# Patient Record
Sex: FEMALE | Race: WHITE | NOT HISPANIC OR LATINO | Employment: FULL TIME | URBAN - METROPOLITAN AREA
[De-identification: names, ages, dates, MRNs, and addresses within clinical notes are randomized per-mention and may not be internally consistent; named-entity substitution may affect disease eponyms.]

---

## 2017-02-24 ENCOUNTER — ALLSCRIPTS OFFICE VISIT (OUTPATIENT)
Dept: OTHER | Facility: OTHER | Age: 47
End: 2017-02-24

## 2017-02-24 LAB
BILIRUB UR QL STRIP: NORMAL
CLARITY UR: NORMAL
COLOR UR: YELLOW
GLUCOSE (HISTORICAL): NORMAL
HGB UR QL STRIP.AUTO: NORMAL
KETONES UR STRIP-MCNC: NORMAL MG/DL
LEUKOCYTE ESTERASE UR QL STRIP: NORMAL
NITRITE UR QL STRIP: NORMAL
PH UR STRIP.AUTO: 7 [PH]
PROT UR STRIP-MCNC: NORMAL MG/DL
SP GR UR STRIP.AUTO: 1.02
UROBILINOGEN UR QL STRIP.AUTO: 0.2

## 2017-04-07 ENCOUNTER — ALLSCRIPTS OFFICE VISIT (OUTPATIENT)
Dept: OTHER | Facility: OTHER | Age: 47
End: 2017-04-07

## 2017-04-07 DIAGNOSIS — H65.93 NONSUPPURATIVE OTITIS MEDIA OF BOTH EARS: ICD-10-CM

## 2017-05-24 ENCOUNTER — ALLSCRIPTS OFFICE VISIT (OUTPATIENT)
Dept: OTHER | Facility: OTHER | Age: 47
End: 2017-05-24

## 2017-05-27 ENCOUNTER — GENERIC CONVERSION - ENCOUNTER (OUTPATIENT)
Dept: OTHER | Facility: OTHER | Age: 47
End: 2017-05-27

## 2017-06-07 ENCOUNTER — GENERIC CONVERSION - ENCOUNTER (OUTPATIENT)
Dept: OTHER | Facility: OTHER | Age: 47
End: 2017-06-07

## 2017-06-09 ENCOUNTER — ALLSCRIPTS OFFICE VISIT (OUTPATIENT)
Dept: OTHER | Facility: OTHER | Age: 47
End: 2017-06-09

## 2017-08-16 ENCOUNTER — GENERIC CONVERSION - ENCOUNTER (OUTPATIENT)
Dept: OTHER | Facility: OTHER | Age: 47
End: 2017-08-16

## 2017-08-29 ENCOUNTER — ALLSCRIPTS OFFICE VISIT (OUTPATIENT)
Dept: OTHER | Facility: OTHER | Age: 47
End: 2017-08-29

## 2017-08-29 DIAGNOSIS — E03.9 HYPOTHYROIDISM: ICD-10-CM

## 2017-08-29 DIAGNOSIS — K59.00 CONSTIPATION: ICD-10-CM

## 2017-08-29 LAB
BILIRUB UR QL STRIP: NEGATIVE
CLARITY UR: NORMAL
COLOR UR: CLEAR
GLUCOSE (HISTORICAL): NEGATIVE
HGB UR QL STRIP.AUTO: NORMAL
KETONES UR STRIP-MCNC: NEGATIVE MG/DL
LEUKOCYTE ESTERASE UR QL STRIP: NEGATIVE
NITRITE UR QL STRIP: NEGATIVE
PH UR STRIP.AUTO: 5 [PH]
PROT UR STRIP-MCNC: 6 MG/DL
SP GR UR STRIP.AUTO: 1.01
UROBILINOGEN UR QL STRIP.AUTO: NEGATIVE

## 2017-09-14 ENCOUNTER — ALLSCRIPTS OFFICE VISIT (OUTPATIENT)
Dept: OTHER | Facility: OTHER | Age: 47
End: 2017-09-14

## 2017-10-02 ENCOUNTER — GENERIC CONVERSION - ENCOUNTER (OUTPATIENT)
Dept: OTHER | Facility: OTHER | Age: 47
End: 2017-10-02

## 2017-11-13 ENCOUNTER — GENERIC CONVERSION - ENCOUNTER (OUTPATIENT)
Dept: OTHER | Facility: OTHER | Age: 47
End: 2017-11-13

## 2017-12-06 ENCOUNTER — GENERIC CONVERSION - ENCOUNTER (OUTPATIENT)
Dept: OTHER | Facility: OTHER | Age: 47
End: 2017-12-06

## 2017-12-06 DIAGNOSIS — E66.9 OBESITY: ICD-10-CM

## 2017-12-06 DIAGNOSIS — R73.01 IMPAIRED FASTING GLUCOSE: ICD-10-CM

## 2017-12-06 DIAGNOSIS — K59.09 OTHER CONSTIPATION: ICD-10-CM

## 2017-12-18 ENCOUNTER — GENERIC CONVERSION - ENCOUNTER (OUTPATIENT)
Dept: OTHER | Facility: OTHER | Age: 47
End: 2017-12-18

## 2018-01-13 VITALS
RESPIRATION RATE: 14 BRPM | BODY MASS INDEX: 34.36 KG/M2 | TEMPERATURE: 99.3 F | OXYGEN SATURATION: 97 % | SYSTOLIC BLOOD PRESSURE: 120 MMHG | HEART RATE: 88 BPM | WEIGHT: 175 LBS | DIASTOLIC BLOOD PRESSURE: 80 MMHG | HEIGHT: 60 IN

## 2018-01-13 VITALS
WEIGHT: 166 LBS | DIASTOLIC BLOOD PRESSURE: 80 MMHG | HEART RATE: 91 BPM | RESPIRATION RATE: 12 BRPM | HEIGHT: 60 IN | SYSTOLIC BLOOD PRESSURE: 142 MMHG | BODY MASS INDEX: 32.59 KG/M2 | OXYGEN SATURATION: 98 %

## 2018-01-13 VITALS
RESPIRATION RATE: 14 BRPM | TEMPERATURE: 99.3 F | DIASTOLIC BLOOD PRESSURE: 80 MMHG | SYSTOLIC BLOOD PRESSURE: 110 MMHG | WEIGHT: 157.25 LBS | OXYGEN SATURATION: 98 % | HEART RATE: 70 BPM | BODY MASS INDEX: 29.69 KG/M2 | HEIGHT: 61 IN

## 2018-01-14 VITALS
SYSTOLIC BLOOD PRESSURE: 135 MMHG | WEIGHT: 164 LBS | DIASTOLIC BLOOD PRESSURE: 80 MMHG | OXYGEN SATURATION: 99 % | BODY MASS INDEX: 32.2 KG/M2 | HEART RATE: 106 BPM | HEIGHT: 60 IN | RESPIRATION RATE: 15 BRPM | TEMPERATURE: 98.2 F

## 2018-01-14 VITALS
WEIGHT: 169.8 LBS | HEART RATE: 83 BPM | HEIGHT: 61 IN | BODY MASS INDEX: 32.06 KG/M2 | OXYGEN SATURATION: 97 % | DIASTOLIC BLOOD PRESSURE: 86 MMHG | TEMPERATURE: 98.8 F | SYSTOLIC BLOOD PRESSURE: 124 MMHG

## 2018-01-15 VITALS
WEIGHT: 167.03 LBS | DIASTOLIC BLOOD PRESSURE: 82 MMHG | BODY MASS INDEX: 32.79 KG/M2 | HEIGHT: 60 IN | SYSTOLIC BLOOD PRESSURE: 124 MMHG | OXYGEN SATURATION: 97 % | HEART RATE: 82 BPM

## 2018-01-15 NOTE — MISCELLANEOUS
Message  Message Free Text Note Form: Phone call from [facility] radiologist Dr Miladis De Jesus with MRI result of L hip  He states it shows incomplete fracture of L femoral neck  Pt contacted and states she had seen Dr Lyssa Beckman from [facility] [place]  I contacted Dr Cheyenne Schmitt who is on call for that practice  He states pt should be on crutches or walker with light toe touching of left leg  She is to call his office on Tuesday and make follow up  Pt is to stay off work Tuesday for now  Pt is aware and understands instructions        Signatures   Electronically signed by : ELIEL Workman; May 27 2017  1:10PM EST                       (Author)

## 2018-01-17 NOTE — PROGRESS NOTES
Assessment    1  Hypothyroidism (244 9) (E03 9)   2  Persistent insomnia (307 42) (G47 00)   3  Situational anxiety (300 09) (F41 8)   4  Obesity (278 00) (E66 9)    Plan  Adjustment insomnia    · OLANZapine 5 MG Oral Tablet; TAKE 1 TABLET AT BEDTIME  Adjustment insomnia, Persistent insomnia    · Temazepam 30 MG Oral Capsule; TAKE 1 CAPSULE AT BEDTIME AS NEEDED  FOR SLEEP  Hypothyroidism    · From  [place] Thyroid 120 MG Oral Tablet TAKE 1 TABLET BY MOUTH EVERY  THURSDAY THROUGH SUNDAY DAILY To [place] Thyroid 120 MG Oral Tablet TAKE 1  TABLET BY MOUTH on Sat and Sunday   · From  [place] Thyroid 90 MG Oral Tablet TAKE 1 TABLET  BY MOUTH EVERY  MONDAY, TUESDAY AND WEDNESDAY DAILY To [place] Thyroid 90 MG Oral Tablet TAKE  1 TABLET  BY MOUTH EVERY MONDAY through Friday  Obesity    · From  Phentermine HCl - 15 MG Oral Capsule take one pill before lunch To  Phentermine HCl - 30 MG Oral Capsule TAKE 1 CAPSULE EVERY MORNING BEFORE  BREAKFAST  Situational anxiety    · OLANZapine 5 MG Oral Tablet Dispersible; may take one tablet during the day  as needed for anxiety prn qd   · Follow-up visit in 3 months Evaluation and Treatment  Follow-up  Status: Hold For -  Scheduling  Requested for: [id number]    Chief Complaint  patient is here to f/u on her medication   Patient is here today for follow up of chronic conditions described in HPI  History of Present Illness  Here for refill of medications  Feeling well  Uses the Zyprexa 5 mg at bedtime and has done so for several years with good results  Uses the Zyprexa dispersible 5 mg once during the day for anxiety as needed  She uses this maybe once or twice a week and has done so for years  She was able to come off Xanax and Ativan with the use of Zyprexa dispersible  Works at [employer] which is now [employer] which soon will be [facility] by the end of the month per [place]    Lots of stress bouts of anxiety but the Zyprexa helps her maintain her composure  Sees an endocrinologist for her hypothyroidism  Is maintained on [place] Thyroid  Last levels were fine      Active Problems    1  Acute otitis media (382 9) (H66 90)   2  Acute sinusitis (461 9) (J01 90)   3  Adjustment insomnia (307 41) (F51 02)   4  Bilateral otitis media with effusion (381 4) (H65 93)   5  Cough (786 2) (R05)   6  Depression with anxiety (300 4) (F41 8)   7  Ear pain, bilateral (388 70) (H92 03)   8  Hypothyroidism (244 9) (E03 9)   9  Jaw pain (784 92) (R68 84)   10  Obesity (278 00) (E66 9)   11  Persistent insomnia (307 42) (G47 00)   12  Situational anxiety (300 09) (F41 8)   13  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)   14  Vitamin B12 deficiency (266 2) (E53 8)    Past Medical History    1  Depression with anxiety (300 4) (F41 8)   2  Hypothyroidism (244 9) (E03 9)   3  Jaw pain (784 92) (R68 84)   4  Persistent insomnia (307 42) (G47 00)    The active problems and past medical history were reviewed and updated today  Surgical History    1  History of Myringotomy - With Ventilating Tube Insertion   2  History of Sinus Surgery    The surgical history was reviewed and updated today  Family History  Mother    1  Adopted (V68 89) (Z02 82)  Father    2  Adopted (M60 81) (Z02 82)    The family history was reviewed and updated today  Social History    · Former smoker (U69 35) (K47 734)   ·    · Rarely consumes alcohol (V49 89) (Z78 9)  The social history was reviewed and updated today  The social history was reviewed and is unchanged  Current Meds   1  [place] Thyroid 120 MG Oral Tablet; TAKE 1 TABLET BY MOUTH EVERY THURSDAY   THROUGH SUNDAY DAILY; Therapy: (Recorded:48Pcf7358) to Recorded   2  [place] Thyroid 90 MG Oral Tablet; TAKE 1 TABLET  BY MOUTH EVERY MONDAY,   TUESDAY AND WEDNESDAY DAILY; Therapy: (Recorded:18Tol8824) to Recorded   3  Fluticasone Propionate 50 MCG/ACT Nasal Suspension; USE 1 SPRAY IN EACH   NOSTRIL TWICE DAILY;    Therapy: 23WSB1590 to (Last Rx:05Oct2016)  Requested for: 95LEX7054 Ordered   4  OLANZapine 5 MG Oral Tablet Dispersible; may take one tablet during the day as   needed for anxiety prn qd; Therapy: 11YCH4251 to (Last Rx:02Nov2016)  Requested for: 98DVO9903 Ordered   5  Omeprazole 40 MG Oral Capsule Delayed Release; Therapy: 56HUM8289 to (Evaluate:16Oct2014) Recorded   6  Phentermine HCl - 15 MG Oral Capsule; take one pill before lunch; Therapy: 93CTT3292 to (Last Rx:02Nov2016) Ordered   7  Temazepam 30 MG Oral Capsule; TAKE 1 CAPSULE AT BEDTIME AS NEEDED FOR   SLEEP; Last Rx:02Nov2016 Ordered   8  ZyPREXA 2 5 MG Oral Tablet; TAKE 1 TABLET DAILY; Therapy: 60EOT2878 to [phone])  Requested for: 28OBG6133; Last   Rx:02Nov2016 Ordered    The medication list was reviewed and updated today  Allergies    1  tizanidine    Vitals  Vital Signs    Recorded: 11Pdb8573 04:25PM   Temperature 98 7 F   Heart Rate 77   Systolic 680   Diastolic 70   Height 5 ft 0 5 in   Weight 167 lb    BMI Calculated 32 08   BSA Calculated 1 74   O2 Saturation 97     Attending Note  Collaborating Note: I supervised the Advanced Practitioner and I agree with the Advanced Practitioner note        Future Appointments    Date/Time Provider Specialty Site   03/22/2017 04:00 PM Ally Brown, [facility], [facility]     Signatures   Electronically signed by : March Collet, [address] [place] [address]; Dec 21 2016  5:37PM EST                       (Author)    Electronically signed by : Traci Aranda DO; Dec 22 2016  8:01AM EST                       (Co-author)

## 2018-01-24 VITALS
OXYGEN SATURATION: 95 % | BODY MASS INDEX: 34.18 KG/M2 | WEIGHT: 174.13 LBS | HEIGHT: 60 IN | DIASTOLIC BLOOD PRESSURE: 82 MMHG | SYSTOLIC BLOOD PRESSURE: 125 MMHG | HEART RATE: 88 BPM | RESPIRATION RATE: 15 BRPM

## 2018-01-24 VITALS
TEMPERATURE: 98.8 F | OXYGEN SATURATION: 97 % | WEIGHT: 181 LBS | RESPIRATION RATE: 16 BRPM | HEIGHT: 60 IN | SYSTOLIC BLOOD PRESSURE: 140 MMHG | HEART RATE: 100 BPM | BODY MASS INDEX: 35.53 KG/M2 | DIASTOLIC BLOOD PRESSURE: 100 MMHG

## 2018-01-31 ENCOUNTER — TELEPHONE (OUTPATIENT)
Dept: FAMILY MEDICINE CLINIC | Facility: CLINIC | Age: 48
End: 2018-01-31

## 2018-01-31 NOTE — TELEPHONE ENCOUNTER
Pt called wanting to know if you revieced her labs and if she needs an appt or if she can get a call with the results    The paper copy is in your basket

## 2018-02-09 ENCOUNTER — TELEPHONE (OUTPATIENT)
Dept: FAMILY MEDICINE CLINIC | Facility: CLINIC | Age: 48
End: 2018-02-09

## 2018-02-09 NOTE — TELEPHONE ENCOUNTER
Call placed to Luz Maria Reyes about her lab work that was done on the 20th of January  Her TSH is elevated slightly at 5 67  Her endocrinologist is Dr Dionicio Vieyra  thyroid was changed to 120 mg Monday through Friday and 90 mg Saturday and Sunday    Her cholesterol and A1c are also slightly elevated   We will talk about that on Monday when she has a follow-up appointment with me

## 2018-02-12 ENCOUNTER — OFFICE VISIT (OUTPATIENT)
Dept: FAMILY MEDICINE CLINIC | Facility: CLINIC | Age: 48
End: 2018-02-12
Payer: COMMERCIAL

## 2018-02-12 VITALS
OXYGEN SATURATION: 98 % | HEIGHT: 60 IN | TEMPERATURE: 98.9 F | DIASTOLIC BLOOD PRESSURE: 90 MMHG | BODY MASS INDEX: 35.3 KG/M2 | HEART RATE: 82 BPM | RESPIRATION RATE: 14 BRPM | SYSTOLIC BLOOD PRESSURE: 140 MMHG | WEIGHT: 179.8 LBS

## 2018-02-12 DIAGNOSIS — H66.93 OTITIS OF BOTH EARS: ICD-10-CM

## 2018-02-12 DIAGNOSIS — F41.9 ANXIETY: Primary | ICD-10-CM

## 2018-02-12 DIAGNOSIS — F51.01 PRIMARY INSOMNIA: ICD-10-CM

## 2018-02-12 PROCEDURE — 99214 OFFICE O/P EST MOD 30 MIN: CPT | Performed by: FAMILY MEDICINE

## 2018-02-12 RX ORDER — TEMAZEPAM 30 MG/1
30 CAPSULE ORAL
Refills: 3 | COMMUNITY
Start: 2018-01-09 | End: 2018-02-12

## 2018-02-12 RX ORDER — LEVOTHYROXINE AND LIOTHYRONINE 57; 13.5 UG/1; UG/1
TABLET ORAL
COMMUNITY
End: 2019-07-31

## 2018-02-12 RX ORDER — OLANZAPINE 5 MG/1
TABLET, ORALLY DISINTEGRATING ORAL
COMMUNITY
Start: 2017-04-07 | End: 2018-02-12 | Stop reason: SDUPTHER

## 2018-02-12 RX ORDER — FLUTICASONE PROPIONATE 50 MCG
1 SPRAY, SUSPENSION (ML) NASAL 2 TIMES DAILY
COMMUNITY
Start: 2016-10-05 | End: 2018-11-30 | Stop reason: ALTCHOICE

## 2018-02-12 RX ORDER — MONTELUKAST SODIUM 10 MG/1
1 TABLET ORAL DAILY
COMMUNITY
Start: 2017-04-14 | End: 2018-11-30 | Stop reason: ALTCHOICE

## 2018-02-12 RX ORDER — HYDROCHLOROTHIAZIDE 12.5 MG/1
TABLET ORAL
Refills: 0 | COMMUNITY
Start: 2017-12-07 | End: 2019-07-31

## 2018-02-12 RX ORDER — OLANZAPINE 5 MG/1
5 TABLET, ORALLY DISINTEGRATING ORAL
Qty: 90 TABLET | Refills: 1 | OUTPATIENT
Start: 2018-02-12 | End: 2018-10-12 | Stop reason: SDUPTHER

## 2018-02-12 RX ORDER — ESZOPICLONE 3 MG/1
TABLET, FILM COATED ORAL
Qty: 30 TABLET | Refills: 3 | OUTPATIENT
Start: 2018-02-12 | End: 2018-02-16 | Stop reason: SDUPTHER

## 2018-02-12 RX ORDER — AMOXICILLIN 875 MG/1
TABLET, COATED ORAL
Qty: 20 TABLET | Refills: 0 | Status: SHIPPED | OUTPATIENT
Start: 2018-02-12 | End: 2018-02-22

## 2018-02-12 RX ORDER — LEVOTHYROXINE AND LIOTHYRONINE 76; 18 UG/1; UG/1
TABLET ORAL
COMMUNITY
End: 2020-02-07 | Stop reason: SDUPTHER

## 2018-02-12 RX ORDER — LINACLOTIDE 290 UG/1
1 CAPSULE, GELATIN COATED ORAL DAILY
Refills: 6 | COMMUNITY
Start: 2017-11-17 | End: 2018-04-18 | Stop reason: ALTCHOICE

## 2018-02-12 RX ORDER — LORAZEPAM 1 MG/1
TABLET ORAL
Qty: 30 TABLET | Refills: 3 | OUTPATIENT
Start: 2018-02-12 | End: 2018-08-06 | Stop reason: SDUPTHER

## 2018-02-12 RX ORDER — METHOCARBAMOL 750 MG/1
1 TABLET, FILM COATED ORAL 2 TIMES DAILY
COMMUNITY
Start: 2017-12-18 | End: 2018-02-12

## 2018-02-12 NOTE — PATIENT INSTRUCTIONS
Start the antibiotic take it twice a day for 10 days   Use [facility] brand of Mucinex plain Mucinex and continue with the thin the secretions behind her eardrums  Use your fluticasone nasal spray 1 spray both sides your nose every day   Use the 1500 calorie diet that the endocrinologist gave you and start the phentermine back up to help you stay on course   make sure you take phentermine earlier in the day so it does not interfere with her sleep

## 2018-02-12 NOTE — PROGRESS NOTES
Standard Visit   Assessment/Plan:     Visit Diagnosis:  Diagnoses and all orders for this visit:    Anxiety  -     OLANZapine (ZyPREXA ZYDIS) 5 mg dispersible tablet; Take 1 tablet (5 mg total) by mouth daily at bedtime for 90 days  -     LORazepam (ATIVAN) 1 mg tablet; May take 1 pill twice a day as needed for anxiety  P r n  Otitis of both ears  -     amoxicillin (AMOXIL) 875 mg tablet; Take 1 pill twice a day for 10 days    Primary insomnia  -     eszopiclone (LUNESTA) tablet; Take 1 pill half an hour before bedtime p r n  Other orders  -     thyroid (ARMOUR THYROID) 120 MG tablet; Take by mouth  -     fluticasone (FLONASE) 50 mcg/act nasal spray; 1 spray into each nostril 2 (two) times a day  -     hydrochlorothiazide (HYDRODIURIL) 12 5 mg tablet; TAKE 1 TABLET AS NEEDED FOR SWELLING OF HANDS AS NEEDED  -     LINZESS 290 MCG CAPS; Take 1 capsule by mouth daily  -     montelukast (SINGULAIR) 10 mg tablet; Take 1 tablet by mouth daily  -     thyroid (ARMOUR THYROID) 90 MG tablet; Take by mouth  -     Discontinue: OLANZapine (ZyPREXA ZYDIS) 5 mg dispersible tablet; Take by mouth  -     Discontinue: methocarbamol (ROBAXIN) 750 mg tablet; Take 1 tablet by mouth 2 (two) times a day  -     Discontinue: temazepam (RESTORIL) 30 mg capsule; Take 30 mg by mouth daily at bedtime as needed for sleep          Subjective:    Patient ID: Panfilo Smallwood is a 41 y o  female  Patient is here with the following concerns:    Her to review labs and meds   We treat her insomnia with temazepam and she has been on that for quite a while   we treat her anxiety with Zyprexa dissolving tab and occasional p r n   Ativan   she treats her chronic constipation with Linzess through Dr Laura is hypothyroid and she takes [place] Thyroid and she follows with Dr Jony Estrada    her last TSH was a bit under replaced so her [place] Thyroid was adjusted   120  Mg Monday through Friday and 90 mg for the weekend        The following portions of the patient's history were reviewed and updated as appropriate: allergies, current medications, past family history, past medical history, past social history, past surgical history and problem list     Review of Systems   Constitutional: Negative for activity change, fatigue, fever and unexpected weight change  HENT: Negative for congestion, ear pain, hearing loss, sinus pain, sore throat, tinnitus, trouble swallowing and voice change  Eyes: Negative for pain, discharge and visual disturbance  Respiratory: Negative for cough, chest tightness, shortness of breath and wheezing  Cardiovascular: Negative for chest pain, palpitations and leg swelling  Gastrointestinal: Negative for abdominal pain, blood in stool, diarrhea and vomiting  Endocrine: Negative for cold intolerance, heat intolerance and polyuria  Genitourinary: Negative for difficulty urinating, dysuria, flank pain, genital sores and urgency  Musculoskeletal: Negative for gait problem, joint swelling and neck stiffness  Skin: Negative for color change, rash and wound  Allergic/Immunologic: Negative for immunocompromised state  Neurological: Negative for syncope, speech difficulty and light-headedness  Psychiatric/Behavioral: Negative for behavioral problems, dysphoric mood and suicidal ideas  /90 (Patient Position: Sitting)   Pulse 82   Temp 98 9 °F (37 2 °C)   Resp 14   Ht 5' (1 524 m)   Wt 81 6 kg (179 lb 12 8 oz)   SpO2 98%   BMI 35 11 kg/m²   Social History     Social History    Marital status: /Civil Union     Spouse name: N/A    Number of children: N/A    Years of education: N/A     Occupational History    Not on file       Social History Main Topics    Smoking status: Never Smoker    Smokeless tobacco: Never Used    Alcohol use Not on file    Drug use: Unknown    Sexual activity: Not on file     Other Topics Concern    Not on file     Social History Narrative    No narrative on file     No past medical history on file  No family history on file  No past surgical history on file  Current Outpatient Prescriptions:     fluticasone (FLONASE) 50 mcg/act nasal spray, 1 spray into each nostril 2 (two) times a day, Disp: , Rfl:     montelukast (SINGULAIR) 10 mg tablet, Take 1 tablet by mouth daily, Disp: , Rfl:     OLANZapine (ZyPREXA ZYDIS) 5 mg dispersible tablet, Take 1 tablet (5 mg total) by mouth daily at bedtime for 90 days, Disp: 90 tablet, Rfl: 1    amoxicillin (AMOXIL) 875 mg tablet, Take 1 pill twice a day for 10 days, Disp: 20 tablet, Rfl: 0    eszopiclone (LUNESTA) tablet, Take 1 pill half an hour before bedtime p r n , Disp: 30 tablet, Rfl: 3    hydrochlorothiazide (HYDRODIURIL) 12 5 mg tablet, TAKE 1 TABLET AS NEEDED FOR SWELLING OF HANDS AS NEEDED, Disp: , Rfl: 0    LINZESS 290 MCG CAPS, Take 1 capsule by mouth daily, Disp: , Rfl: 6    LORazepam (ATIVAN) 1 mg tablet, May take 1 pill twice a day as needed for anxiety  P r n , Disp: 30 tablet, Rfl: 3    thyroid (ARMOUR THYROID) 120 MG tablet, Take by mouth, Disp: , Rfl:     thyroid (ARMOUR THYROID) 90 MG tablet, Take by mouth, Disp: , Rfl:       Objective:     Physical Exam   Constitutional: She is oriented to person, place, and time  She appears well-developed and well-nourished  HENT:   Head: Normocephalic and atraumatic  Eyes: Pupils are equal, round, and reactive to light  Neck: Normal range of motion  Neck supple  Cardiovascular: Normal rate and normal heart sounds  No murmur heard  Pulmonary/Chest: Effort normal and breath sounds normal  No respiratory distress  She exhibits no tenderness  Abdominal: Soft  Bowel sounds are normal    Musculoskeletal: Normal range of motion  She exhibits no tenderness  Lymphadenopathy:     She has no cervical adenopathy  Neurological: She is alert and oriented to person, place, and time  Coordination normal    Skin: Skin is warm and dry     Psychiatric: She has a normal mood and affect  Thought content normal    Nursing note and vitals reviewed          Patient Instructions    Start the antibiotic take it twice a day for 10 days   Use [facility] brand of Mucinex plain Mucinex and continue with the thin the secretions behind her eardrums  Use your fluticasone nasal spray 1 spray both sides your nose every day   Use the 1500 calorie diet that the endocrinologist gave you and start the phentermine back up to help you stay on course   make sure you take phentermine earlier in the day so it does not interfere with her sleep      Follow up here in prANNALEE Corado

## 2018-02-16 ENCOUNTER — TELEPHONE (OUTPATIENT)
Dept: FAMILY MEDICINE CLINIC | Facility: CLINIC | Age: 48
End: 2018-02-16

## 2018-02-16 DIAGNOSIS — F51.01 PRIMARY INSOMNIA: ICD-10-CM

## 2018-02-16 RX ORDER — ESZOPICLONE 3 MG/1
TABLET, FILM COATED ORAL
Qty: 30 TABLET | Refills: 3 | OUTPATIENT
Start: 2018-02-16 | End: 2018-08-06 | Stop reason: SDUPTHER

## 2018-02-16 NOTE — TELEPHONE ENCOUNTER
Ezequiel Cruz,    please call Vielka Mann and get the phone number for [facility]   it is the pharmacy in the basement of [facility]   the and then call in her Lunesta it's 3 mg 1 p o  Q h s  P r n   Insomnia dispense 30 with 3 refills thank you

## 2018-02-16 NOTE — TELEPHONE ENCOUNTER
Patient called, there is no dosage on her Rx for Lunesta  It should be sent to [address]  I can call the information in for you also    Thank you, Jacqueline Aguirre

## 2018-03-01 ENCOUNTER — TELEPHONE (OUTPATIENT)
Dept: FAMILY MEDICINE CLINIC | Facility: CLINIC | Age: 48
End: 2018-03-01

## 2018-03-01 DIAGNOSIS — R52 PAIN: Primary | ICD-10-CM

## 2018-03-01 RX ORDER — TRAMADOL HYDROCHLORIDE 50 MG/1
50 TABLET ORAL EVERY 8 HOURS PRN
Qty: 60 TABLET | Refills: 1 | Status: SHIPPED | OUTPATIENT
Start: 2018-03-01 | End: 2018-03-05 | Stop reason: SDUPTHER

## 2018-03-05 DIAGNOSIS — R52 PAIN: ICD-10-CM

## 2018-03-08 RX ORDER — TRAMADOL HYDROCHLORIDE 50 MG/1
50 TABLET ORAL EVERY 8 HOURS PRN
Qty: 60 TABLET | Refills: 0 | Status: SHIPPED | OUTPATIENT
Start: 2018-03-08 | End: 2018-03-15 | Stop reason: SDUPTHER

## 2018-03-15 ENCOUNTER — OFFICE VISIT (OUTPATIENT)
Dept: FAMILY MEDICINE CLINIC | Facility: CLINIC | Age: 48
End: 2018-03-15
Payer: COMMERCIAL

## 2018-03-15 VITALS
HEART RATE: 81 BPM | DIASTOLIC BLOOD PRESSURE: 82 MMHG | OXYGEN SATURATION: 96 % | BODY MASS INDEX: 33.85 KG/M2 | WEIGHT: 172.4 LBS | HEIGHT: 60 IN | SYSTOLIC BLOOD PRESSURE: 124 MMHG

## 2018-03-15 DIAGNOSIS — M62.838 MUSCLE SPASM: Primary | ICD-10-CM

## 2018-03-15 DIAGNOSIS — R52 PAIN: ICD-10-CM

## 2018-03-15 PROCEDURE — 99213 OFFICE O/P EST LOW 20 MIN: CPT | Performed by: FAMILY MEDICINE

## 2018-03-15 RX ORDER — TRAMADOL HYDROCHLORIDE 50 MG/1
50 TABLET ORAL EVERY 8 HOURS PRN
Qty: 90 TABLET | Refills: 1 | Status: SHIPPED | OUTPATIENT
Start: 2018-03-15 | End: 2018-04-14

## 2018-03-15 RX ORDER — BACLOFEN 20 MG/1
20 TABLET ORAL 3 TIMES DAILY PRN
Qty: 60 TABLET | Refills: 1 | Status: SHIPPED | OUTPATIENT
Start: 2018-03-15 | End: 2018-09-29 | Stop reason: SDUPTHER

## 2018-03-15 NOTE — PROGRESS NOTES
Standard Visit   Assessment/Plan:     Visit Diagnosis:  Diagnoses and all orders for this visit:    Muscle spasm  -     baclofen 20 mg tablet; Take 1 tablet (20 mg total) by mouth 3 (three) times a day as needed for muscle spasms for up to 30 days    Pain  -     traMADol (ULTRAM) 50 mg tablet; Take 1 tablet (50 mg total) by mouth every 8 (eight) hours as needed for moderate pain for up to 30 days  -     XR sacroiliac joints 3+ views; Future        get the x-ray of the SI joint   warm soaks to the area will also help   use the baclofen for the pain in the thigh   time will heal this    Subjective:    Patient ID: Gustavo Glass is a 41 y o  female  Patient is here with the following concerns:    Don Lidia down the stairs   actually bounced on the last stair   while carrying laundry down stairs   the brunt of the impact was on her left butt   she has no actual vertebral pain   the lingering pain is actually in her left thigh and it feels tight, spasm   tail bone does not hurt   she does have a past history of a left hip fracture   DEXA  The is up-to-date and does not show osteopenia or osteoporosis        The following portions of the patient's history were reviewed and updated as appropriate: allergies, current medications, past family history, past medical history, past social history, past surgical history and problem list     Review of Systems   Constitutional: Negative for activity change, fatigue, fever and unexpected weight change  HENT: Negative for congestion, ear pain, hearing loss, sinus pain, sore throat, tinnitus, trouble swallowing and voice change  Eyes: Negative for pain, discharge and visual disturbance  Respiratory: Negative for cough, chest tightness, shortness of breath and wheezing  Cardiovascular: Negative for chest pain, palpitations and leg swelling  Gastrointestinal: Negative for abdominal pain, blood in stool, diarrhea and vomiting     Endocrine: Negative for cold intolerance, heat intolerance and polyuria  Genitourinary: Negative for difficulty urinating, dysuria, flank pain, genital sores and urgency  Musculoskeletal: Positive for arthralgias and myalgias  Negative for gait problem, joint swelling and neck stiffness  Skin: Negative for color change, rash and wound  Allergic/Immunologic: Negative for immunocompromised state  Neurological: Negative for syncope, speech difficulty and light-headedness  Psychiatric/Behavioral: Negative for behavioral problems, dysphoric mood and suicidal ideas  /82   Pulse 81   Ht 5' (1 524 m)   Wt 78 2 kg (172 lb 6 4 oz)   SpO2 96%   BMI 33 67 kg/m²   Social History     Social History    Marital status: /Civil Union     Spouse name: N/A    Number of children: N/A    Years of education: N/A     Occupational History    Not on file       Social History Main Topics    Smoking status: Never Smoker    Smokeless tobacco: Never Used      Comment: former smoker noted in "allscripts"    Alcohol use Yes      Comment: Rarely     Drug use: Unknown    Sexual activity: Not on file     Other Topics Concern    Not on file     Social History Narrative    No narrative on file     Past Medical History:   Diagnosis Date    Depression with anxiety     last assessed: April 11, 2017    Hypothyroidism     last assessed: Aug 29, 2017    Persistent insomnia     last assessed: Dec 21, 2016     Family History   Problem Relation Age of Onset    Adopted: Yes     Past Surgical History:   Procedure Laterality Date    MYRINGOTOMY W/ TUBES Left     SINUS SURGERY         Current Outpatient Prescriptions:     eszopiclone (LUNESTA) tablet, Take 1 pill half an hour before bedtime p r n , Disp: 30 tablet, Rfl: 3    fluticasone (FLONASE) 50 mcg/act nasal spray, 1 spray into each nostril 2 (two) times a day, Disp: , Rfl:     hydrochlorothiazide (HYDRODIURIL) 12 5 mg tablet, TAKE 1 TABLET AS NEEDED FOR SWELLING OF HANDS AS NEEDED, Disp: , Rfl: 0    LINZESS 290 MCG CAPS, Take 1 capsule by mouth daily, Disp: , Rfl: 6    LORazepam (ATIVAN) 1 mg tablet, May take 1 pill twice a day as needed for anxiety  P r n , Disp: 30 tablet, Rfl: 3    montelukast (SINGULAIR) 10 mg tablet, Take 1 tablet by mouth daily, Disp: , Rfl:     OLANZapine (ZyPREXA ZYDIS) 5 mg dispersible tablet, Take 1 tablet (5 mg total) by mouth daily at bedtime for 90 days, Disp: 90 tablet, Rfl: 1    thyroid (ARMOUR THYROID) 120 MG tablet, Take by mouth, Disp: , Rfl:     thyroid (ARMOUR THYROID) 90 MG tablet, Take by mouth, Disp: , Rfl:     traMADol (ULTRAM) 50 mg tablet, Take 1 tablet (50 mg total) by mouth every 8 (eight) hours as needed for moderate pain for up to 30 days, Disp: 90 tablet, Rfl: 1    baclofen 20 mg tablet, Take 1 tablet (20 mg total) by mouth 3 (three) times a day as needed for muscle spasms for up to 30 days, Disp: 60 tablet, Rfl: 1      Objective:     Physical Exam   Constitutional: She is oriented to person, place, and time  She appears well-developed and well-nourished  HENT:   Head: Normocephalic and atraumatic  Eyes: Pupils are equal, round, and reactive to light  Neck: Normal range of motion  Neck supple  Cardiovascular: Normal rate and normal heart sounds  No murmur heard  Pulmonary/Chest: Effort normal and breath sounds normal  No respiratory distress  She exhibits no tenderness  Abdominal: Soft  Bowel sounds are normal    Musculoskeletal: Normal range of motion  She exhibits no tenderness  No discrete tailbone pain no discrete SI joint pain   no hematoma noted but she does have soft tissue tenderness in the area   Lymphadenopathy:     She has no cervical adenopathy  Neurological: She is alert and oriented to person, place, and time  Coordination normal    Skin: Skin is warm and dry  Psychiatric: She has a normal mood and affect  Thought content normal    Nursing note and vitals reviewed        Social History     Social History  Marital status: /Civil Union     Spouse name: N/A    Number of children: N/A    Years of education: N/A     Occupational History    Not on file       Social History Main Topics    Smoking status: Never Smoker    Smokeless tobacco: Never Used      Comment: former smoker noted in "allscripts"    Alcohol use Yes      Comment: Rarely     Drug use: Unknown    Sexual activity: Not on file     Other Topics Concern    Not on file     Social History Narrative    No narrative on file     Past Medical History:   Diagnosis Date    Depression with anxiety     last assessed: April 11, 2017    Hypothyroidism     last assessed: Aug 29, 2017    Persistent insomnia     last assessed: Dec 21, 2016       Current Outpatient Prescriptions:     eszopiclone (LUNESTA) tablet, Take 1 pill half an hour before bedtime p r n , Disp: 30 tablet, Rfl: 3    fluticasone (FLONASE) 50 mcg/act nasal spray, 1 spray into each nostril 2 (two) times a day, Disp: , Rfl:     hydrochlorothiazide (HYDRODIURIL) 12 5 mg tablet, TAKE 1 TABLET AS NEEDED FOR SWELLING OF HANDS AS NEEDED, Disp: , Rfl: 0    LINZESS 290 MCG CAPS, Take 1 capsule by mouth daily, Disp: , Rfl: 6    LORazepam (ATIVAN) 1 mg tablet, May take 1 pill twice a day as needed for anxiety  P r n , Disp: 30 tablet, Rfl: 3    montelukast (SINGULAIR) 10 mg tablet, Take 1 tablet by mouth daily, Disp: , Rfl:     OLANZapine (ZyPREXA ZYDIS) 5 mg dispersible tablet, Take 1 tablet (5 mg total) by mouth daily at bedtime for 90 days, Disp: 90 tablet, Rfl: 1    thyroid (ARMOUR THYROID) 120 MG tablet, Take by mouth, Disp: , Rfl:     thyroid (ARMOUR THYROID) 90 MG tablet, Take by mouth, Disp: , Rfl:     traMADol (ULTRAM) 50 mg tablet, Take 1 tablet (50 mg total) by mouth every 8 (eight) hours as needed for moderate pain for up to 30 days, Disp: 90 tablet, Rfl: 1    baclofen 20 mg tablet, Take 1 tablet (20 mg total) by mouth 3 (three) times a day as needed for muscle spasms for up to 30 days, Disp: 60 tablet, Rfl: 1  Family History   Problem Relation Age of Onset    Adopted: Yes       There are no Patient Instructions on file for this visit      get the x-ray of the SI joint   warm soaks to the area will also help   use the baclofen for the pain in the thigh   time will heal this      ANNALEE Dubois

## 2018-03-15 NOTE — PATIENT INSTRUCTIONS
get the x-ray of the SI joint   warm soaks to the area will also help   use the baclofen for the pain in the thigh   time will heal this

## 2018-03-26 ENCOUNTER — TELEPHONE (OUTPATIENT)
Dept: FAMILY MEDICINE CLINIC | Facility: CLINIC | Age: 48
End: 2018-03-26

## 2018-03-26 NOTE — TELEPHONE ENCOUNTER
Pt called asking for her Xray results done on 03/15  Results in chart, she also wants to let you know that she still in pain and maybe a lower spine Xray will be a good idea  She is aware we will call her back on Wednesday  Thank you

## 2018-03-27 NOTE — TELEPHONE ENCOUNTER
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

## 2018-04-18 ENCOUNTER — OFFICE VISIT (OUTPATIENT)
Dept: FAMILY MEDICINE CLINIC | Facility: CLINIC | Age: 48
End: 2018-04-18
Payer: COMMERCIAL

## 2018-04-18 VITALS
WEIGHT: 171 LBS | BODY MASS INDEX: 33.57 KG/M2 | HEART RATE: 58 BPM | DIASTOLIC BLOOD PRESSURE: 68 MMHG | OXYGEN SATURATION: 98 % | SYSTOLIC BLOOD PRESSURE: 122 MMHG | HEIGHT: 60 IN

## 2018-04-18 DIAGNOSIS — F06.4 ANXIETY DISORDER DUE TO MEDICAL CONDITION: ICD-10-CM

## 2018-04-18 DIAGNOSIS — Q05.7 SPINA BIFIDA OF LUMBOSACRAL REGION WITHOUT HYDROCEPHALUS (HCC): Primary | ICD-10-CM

## 2018-04-18 PROCEDURE — 99214 OFFICE O/P EST MOD 30 MIN: CPT | Performed by: FAMILY MEDICINE

## 2018-04-18 PROCEDURE — 3008F BODY MASS INDEX DOCD: CPT | Performed by: FAMILY MEDICINE

## 2018-04-18 RX ORDER — DIAZEPAM 10 MG/1
TABLET ORAL
Qty: 5 TABLET | Refills: 0 | Status: SHIPPED | OUTPATIENT
Start: 2018-04-18 | End: 2018-11-30 | Stop reason: ALTCHOICE

## 2018-04-18 RX ORDER — TEMAZEPAM 30 MG/1
30 CAPSULE ORAL
Refills: 3 | COMMUNITY
Start: 2018-03-13 | End: 2018-04-18 | Stop reason: ALTCHOICE

## 2018-04-18 NOTE — PATIENT INSTRUCTIONS
we are going to get an MRI of year lower back  We have found the you have spinal bifida occulta and you have severe back pain  Because of the MRI and you're fear of in closed spaces we are going to order Valium that she is going to take 0 5 hr before the procedure   You going to have to have a  so have your hubby drive you  Your going to be fine! After the MRI, then we will discuss physical therapy     we are going to get an MRI of year lower back  We have found the you have spinal bifida occulta and you have severe back pain  Because of the MRI and you're fear of in closed spaces we are going to order Valium that she is going to take 0 5 hr before the procedure   You going to have to have a  so have your hubby drive you  Your going to be fine! After the MRI, then we will discuss physical therapy     we are going to get an MRI of year lower back  We have found the you have spinal bifida occulta and you have severe back pain  Because of the MRI and you're fear of in closed spaces we are going to order Valium that she is going to take 0 5 hr before the procedure   You going to have to have a  so have your hubby drive you  Your going to be fine!   After the MRI, then we will discuss physical therapy

## 2018-04-18 NOTE — PROGRESS NOTES
Standard Visit   Assessment/Plan:     Visit Diagnosis:  Diagnoses and all orders for this visit:    Spina bifida of lumbosacral region without hydrocephalus ([facility] [address] )  -     MRI lumbar spine wo contrast; Future    Anxiety disorder due to medical condition  -     diazepam (VALIUM) 10 mg tablet; Take 1 pill 0 5 hr prior to MRI    Other orders  -     Discontinue: temazepam (RESTORIL) 30 mg capsule; Take 30 mg by mouth daily at bedtime as needed for sleep     we are going to get an MRI of year lower back  We have found the you have spinal bifida occulta and you have severe back pain  Because of the MRI and you're fear of in closed spaces we are going to order Valium that she is going to take 0 5 hr before the procedure   You going to have to have a  so have your hubby drive you  Your going to be fine! After the MRI, then we will discuss physical therapy         Subjective:    Patient ID: Juan Ghosh is a 41 y o  female  Patient is here with the following concerns:    Here for follow-up she is doing well  Her weight is coming down she is doing very well with her weight actually she signed up for [employer] [place] and she actually found out who her biological father is   she still does not know her biological mother's  History   the Delma Klinefelter is working quite well to get her to sleep however it is not keeping her to sleep she wakes up in the morning around 1 o'clock   She gets up and goes to sleep in the recliner and she does fall asleep quickly but we want the goal to be for her to stay asleep     She still working at [employer] at [employer]        The following portions of the patient's history were reviewed and updated as appropriate: allergies, current medications, past family history, past medical history, past social history, past surgical history and problem list     Review of Systems   Constitutional: Negative for activity change, fatigue, fever and unexpected weight change     HENT: Negative for congestion, ear pain, hearing loss, sinus pain, sore throat, tinnitus, trouble swallowing and voice change  Eyes: Negative for pain, discharge and visual disturbance  Respiratory: Negative for cough, chest tightness, shortness of breath and wheezing  Cardiovascular: Negative for chest pain, palpitations and leg swelling  Gastrointestinal: Negative for abdominal pain, blood in stool, diarrhea and vomiting  Endocrine: Negative for cold intolerance, heat intolerance and polyuria  Genitourinary: Negative for difficulty urinating, dysuria, flank pain, genital sores and urgency  Musculoskeletal: Negative for gait problem, joint swelling and neck stiffness  Skin: Negative for color change, rash and wound  Allergic/Immunologic: Negative for immunocompromised state  Neurological: Negative for syncope, speech difficulty and light-headedness  Psychiatric/Behavioral: Negative for behavioral problems, dysphoric mood and suicidal ideas  /68   Pulse 58   Ht 5' (1 524 m)   Wt 77 6 kg (171 lb)   SpO2 98%   BMI 33 40 kg/m²   Social History     Social History    Marital status: /Civil Union     Spouse name: N/A    Number of children: N/A    Years of education: N/A     Occupational History    Not on file       Social History Main Topics    Smoking status: Never Smoker    Smokeless tobacco: Never Used      Comment: former smoker noted in "allscripts"    Alcohol use Yes      Comment: Rarely     Drug use: Unknown    Sexual activity: Not on file     Other Topics Concern    Not on file     Social History Narrative    No narrative on file     Past Medical History:   Diagnosis Date    Depression with anxiety     last assessed: April 11, 2017    Hypothyroidism     last assessed: Aug 29, 2017    Persistent insomnia     last assessed: Dec 21, 2016     Family History   Problem Relation Age of Onset    Adopted: Yes     Past Surgical History:   Procedure Laterality Date    MYRINGOTOMY W/ TUBES Left  SINUS SURGERY         Current Outpatient Prescriptions:     fluticasone (FLONASE) 50 mcg/act nasal spray, 1 spray into each nostril 2 (two) times a day, Disp: , Rfl:     hydrochlorothiazide (HYDRODIURIL) 12 5 mg tablet, TAKE 1 TABLET AS NEEDED FOR SWELLING OF HANDS AS NEEDED, Disp: , Rfl: 0    LORazepam (ATIVAN) 1 mg tablet, May take 1 pill twice a day as needed for anxiety  P r n , Disp: 30 tablet, Rfl: 3    montelukast (SINGULAIR) 10 mg tablet, Take 1 tablet by mouth daily, Disp: , Rfl:     OLANZapine (ZyPREXA ZYDIS) 5 mg dispersible tablet, Take 1 tablet (5 mg total) by mouth daily at bedtime for 90 days, Disp: 90 tablet, Rfl: 1    thyroid (ARMOUR THYROID) 120 MG tablet, Take by mouth, Disp: , Rfl:     thyroid (ARMOUR THYROID) 90 MG tablet, Take by mouth, Disp: , Rfl:     baclofen 20 mg tablet, Take 1 tablet (20 mg total) by mouth 3 (three) times a day as needed for muscle spasms for up to 30 days, Disp: 60 tablet, Rfl: 1    diazepam (VALIUM) 10 mg tablet, Take 1 pill 0 5 hr prior to MRI, Disp: 5 tablet, Rfl: 0    eszopiclone (LUNESTA) tablet, Take 1 pill half an hour before bedtime p r n , Disp: 30 tablet, Rfl: 3      Objective:     Physical Exam   Constitutional: She is oriented to person, place, and time  She appears well-developed and well-nourished  HENT:   Head: Normocephalic and atraumatic  Eyes: Pupils are equal, round, and reactive to light  Neck: Normal range of motion  Neck supple  Cardiovascular: Normal rate and normal heart sounds  No murmur heard  Pulmonary/Chest: Effort normal and breath sounds normal  No respiratory distress  She exhibits no tenderness  Abdominal: Soft  Bowel sounds are normal    Musculoskeletal: Normal range of motion  She exhibits no tenderness  Lymphadenopathy:     She has no cervical adenopathy  Neurological: She is alert and oriented to person, place, and time  Coordination normal    Skin: Skin is warm and dry     Psychiatric: She has a normal mood and affect  Thought content normal    Nursing note and vitals reviewed  Social History     Social History    Marital status: /Civil Union     Spouse name: N/A    Number of children: N/A    Years of education: N/A     Occupational History    Not on file       Social History Main Topics    Smoking status: Never Smoker    Smokeless tobacco: Never Used      Comment: former smoker noted in "allscripts"    Alcohol use Yes      Comment: Rarely     Drug use: Unknown    Sexual activity: Not on file     Other Topics Concern    Not on file     Social History Narrative    No narrative on file     Past Medical History:   Diagnosis Date    Depression with anxiety     last assessed: April 11, 2017    Hypothyroidism     last assessed: Aug 29, 2017    Persistent insomnia     last assessed: Dec 21, 2016       Current Outpatient Prescriptions:     fluticasone (FLONASE) 50 mcg/act nasal spray, 1 spray into each nostril 2 (two) times a day, Disp: , Rfl:     hydrochlorothiazide (HYDRODIURIL) 12 5 mg tablet, TAKE 1 TABLET AS NEEDED FOR SWELLING OF HANDS AS NEEDED, Disp: , Rfl: 0    LORazepam (ATIVAN) 1 mg tablet, May take 1 pill twice a day as needed for anxiety  P r n , Disp: 30 tablet, Rfl: 3    montelukast (SINGULAIR) 10 mg tablet, Take 1 tablet by mouth daily, Disp: , Rfl:     OLANZapine (ZyPREXA ZYDIS) 5 mg dispersible tablet, Take 1 tablet (5 mg total) by mouth daily at bedtime for 90 days, Disp: 90 tablet, Rfl: 1    thyroid (ARMOUR THYROID) 120 MG tablet, Take by mouth, Disp: , Rfl:     thyroid (ARMOUR THYROID) 90 MG tablet, Take by mouth, Disp: , Rfl:     baclofen 20 mg tablet, Take 1 tablet (20 mg total) by mouth 3 (three) times a day as needed for muscle spasms for up to 30 days, Disp: 60 tablet, Rfl: 1    diazepam (VALIUM) 10 mg tablet, Take 1 pill 0 5 hr prior to MRI, Disp: 5 tablet, Rfl: 0    eszopiclone (LUNESTA) tablet, Take 1 pill half an hour before bedtime p r n , Disp: 30 tablet, Rfl: 3  Family History   Problem Relation Age of Onset    Adopted: Yes       There are no Patient Instructions on file for this visit  we are going to get an MRI of year lower back  We have found the you have spinal bifida occulta and you have severe back pain  Because of the MRI and you're fear of in closed spaces we are going to order Valium that she is going to take 0 5 hr before the procedure   You going to have to have a  so have your hubby drive you  Your going to be fine!   After the MRI, then we will discuss physical therapy           Patrice Meckel, CRNP

## 2018-07-09 ENCOUNTER — TELEPHONE (OUTPATIENT)
Dept: FAMILY MEDICINE CLINIC | Facility: CLINIC | Age: 48
End: 2018-07-09

## 2018-07-09 NOTE — TELEPHONE ENCOUNTER
Pt left a message on the medline asking for the MRI results  I don't see anything in the chart  Please call the pt to verify when and where she had it done

## 2018-07-11 NOTE — TELEPHONE ENCOUNTER
MRI LUMBAR SPINE WO CONTRAST (05/30/2018 3:29 PM)  MRI LUMBAR SPINE WO CONTRAST (05/30/2018 3:29 PM)   Specimen Performing Laboratory     RADIOLOGY       MRI LUMBAR SPINE WO CONTRAST (05/30/2018 3:29 PM)   Narrative           MRI lumbar spine unenhanced        History: Low back pain  Numbness  Pain and numbness radiating to    left lower extremity and also right lower extremity  No recent    trauma  Spina bifida of the lumbosacral region         Comparison: None         Technique: MRI of the lumbar spine was performed in the sagittal and    axial planes without intravenous contrast material         Report:        There is normal alignment and lordosis  There is a very subtle grade    1 retrolisthesis of L2 on L3  There is sacralization of L5 with a    rudimentary disc at L5-S1  Conus medullaris terminates at T12-L1         At T12-L1, there is minor bulging  There is no spinal stenosis or    foraminal stenosis          At L1-2, L2-3, and L3-4, there is no spinal stenosis or foraminal    stenosis          At L4-5, there is no spinal stenosis or appreciable foraminal    stenosis          At L5-S1, there is no spinal stenosis  There is no foraminal    stenosis          Paraspinal soft tissues are unremarkable         Impression:        Sacralization of L5 with a rudimentary disc at L5-S1  No spinal or    foraminal stenosis throughout the entire lumbar spine despite disc    bulging at L1-2 and L2-3                    MRI LUMBAR SPINE WO CONTRAST (05/30/2018 3:29 PM)   Procedure Note   Interface, Rad Results In - 05/30/2018 7:49 PM EDT        MRI lumbar spine unenhanced    History: Low back pain  Numbness  Pain and numbness radiating to  left lower extremity and also right lower extremity  No recent  trauma  Spina bifida of the lumbosacral region  Comparison: None  Technique: MRI of the lumbar spine was performed in the sagittal and  axial planes without intravenous contrast material     Report:     There is normal alignment and lordosis  There is a very subtle grade  1 retrolisthesis of L2 on L3  There is sacralization of L5 with a  rudimentary disc at L5-S1  Conus medullaris terminates at T12-L1  At T12-L1, there is minor bulging  There is no spinal stenosis or  foraminal stenosis  At L1-2, L2-3, and L3-4, there is no spinal stenosis or foraminal  stenosis  At L4-5, there is no spinal stenosis or appreciable foraminal  stenosis  At L5-S1, there is no spinal stenosis  There is no foraminal  stenosis  Paraspinal soft tissues are unremarkable  Impression:    Sacralization of L5 with a rudimentary disc at L5-S1   No spinal or  foraminal stenosis throughout the entire lumbar spine despite disc  bulging at L1-2 and L2-3

## 2018-08-02 DIAGNOSIS — F51.01 PRIMARY INSOMNIA: ICD-10-CM

## 2018-08-02 DIAGNOSIS — F41.9 ANXIETY: ICD-10-CM

## 2018-08-02 NOTE — TELEPHONE ENCOUNTER
Ativan - with RF    Lunesta - with RF    [facility]    She needs it before [facility]       Patient called this morning to see if meds were called in        Site checked ok

## 2018-08-06 RX ORDER — LORAZEPAM 1 MG/1
TABLET ORAL
Qty: 90 TABLET | Refills: 1 | OUTPATIENT
Start: 2018-08-06 | End: 2019-02-04 | Stop reason: SDUPTHER

## 2018-08-06 RX ORDER — ESZOPICLONE 3 MG/1
TABLET, FILM COATED ORAL
Qty: 90 TABLET | Refills: 1 | Status: SHIPPED | OUTPATIENT
Start: 2018-08-06 | End: 2018-11-30 | Stop reason: ALTCHOICE

## 2018-09-29 DIAGNOSIS — M62.838 MUSCLE SPASM: ICD-10-CM

## 2018-09-29 RX ORDER — BACLOFEN 20 MG/1
TABLET ORAL
Qty: 60 TABLET | Refills: 1 | Status: SHIPPED | OUTPATIENT
Start: 2018-09-29 | End: 2018-11-30 | Stop reason: ALTCHOICE

## 2018-10-12 DIAGNOSIS — F41.9 ANXIETY: ICD-10-CM

## 2018-10-12 RX ORDER — OLANZAPINE 5 MG/1
5 TABLET, ORALLY DISINTEGRATING ORAL
Qty: 90 TABLET | Refills: 0 | OUTPATIENT
Start: 2018-10-12 | End: 2019-03-06 | Stop reason: SDUPTHER

## 2018-11-08 ENCOUNTER — OFFICE VISIT (OUTPATIENT)
Dept: FAMILY MEDICINE CLINIC | Facility: CLINIC | Age: 48
End: 2018-11-08
Payer: COMMERCIAL

## 2018-11-08 VITALS
SYSTOLIC BLOOD PRESSURE: 120 MMHG | HEIGHT: 60 IN | WEIGHT: 173.4 LBS | DIASTOLIC BLOOD PRESSURE: 82 MMHG | BODY MASS INDEX: 34.04 KG/M2 | OXYGEN SATURATION: 98 % | HEART RATE: 60 BPM

## 2018-11-08 DIAGNOSIS — L08.9 INFECTED SKIN LESION: Primary | ICD-10-CM

## 2018-11-08 PROCEDURE — 99213 OFFICE O/P EST LOW 20 MIN: CPT | Performed by: FAMILY MEDICINE

## 2018-11-08 PROCEDURE — 3008F BODY MASS INDEX DOCD: CPT | Performed by: FAMILY MEDICINE

## 2018-11-08 RX ORDER — SULFAMETHOXAZOLE AND TRIMETHOPRIM 800; 160 MG/1; MG/1
1 TABLET ORAL EVERY 12 HOURS SCHEDULED
Qty: 20 TABLET | Refills: 0 | Status: SHIPPED | OUTPATIENT
Start: 2018-11-08 | End: 2018-11-18

## 2018-11-08 NOTE — PATIENT INSTRUCTIONS
It is not clear that this lesion is a boil  I tend to think it is an infected skin lesion  I would like you to take the Bactrim twice daily for the next 10 days  Warm soak the area with either a warm wet tab all as warm as tolerable every 4 hours for about 20 minutes for the next week  If it drains and feels better that is fine  If the lesion persists I would like you to make an appointment in 2 weeks for removal of lesion and a biopsy  I question an infected keloid  Keep the appt with Dr Abraham Carey for the ear recheck

## 2018-11-08 NOTE — PROGRESS NOTES
Assessment/Plan:     Chronic Problems:  No problem-specific Assessment & Plan notes found for this encounter  Visit Diagnosis:  Diagnoses and all orders for this visit:    Infected skin lesion  Comments: Will treat with bactrim bid and warm soaks  Not clear that this is a boil  This lesion may have to be removed  Orders:  -     sulfamethoxazole-trimethoprim (BACTRIM DS) 800-160 mg per tablet; Take 1 tablet by mouth every 12 (twelve) hours for 10 days          Subjective:    Patient ID: Jemal Johnson is a 57 y o  female  Pt has a boil under the left buttock for about 1 week  Has a white head on it  Has not tried anything for it yet  Did not drain yet  Remember hitting the area while shaving and then noticed it was there  Now she is very aware of the area  Takes all other meds as directed  No side effects noted  Pt had recent ear tubes put in by Dr Gustabo Goltz  Hearing is better but ear still feels clogged  The following portions of the patient's history were reviewed and updated as appropriate: allergies, current medications, past family history, past medical history, past social history, past surgical history and problem list     Review of Systems   Constitutional: Negative for chills, diaphoresis, fatigue and fever  HENT: Negative  Eyes: Negative  Respiratory: Negative for cough, shortness of breath and wheezing  Cardiovascular: Negative for chest pain and palpitations  Skin: Positive for wound  Neurological: Negative for dizziness, light-headedness and headaches  /82   Pulse 60   Ht 5' (1 524 m)   Wt 78 7 kg (173 lb 6 4 oz)   SpO2 98%   BMI 33 86 kg/m²   Social History     Social History    Marital status: /Civil Union     Spouse name: N/A    Number of children: N/A    Years of education: N/A     Occupational History    Not on file       Social History Main Topics    Smoking status: Never Smoker    Smokeless tobacco: Never Used      Comment: former smoker noted in "allscripts"    Alcohol use Yes      Comment: Rarely     Drug use: Unknown    Sexual activity: Not on file     Other Topics Concern    Not on file     Social History Narrative    No narrative on file     Past Medical History:   Diagnosis Date    Depression with anxiety     last assessed: April 11, 2017    Hypothyroidism     last assessed: Aug 29, 2017    Persistent insomnia     last assessed: Dec 21, 2016     Family History   Problem Relation Age of Onset    Adopted: Yes     Past Surgical History:   Procedure Laterality Date    MYRINGOTOMY W/ TUBES Left     SINUS SURGERY         Current Outpatient Prescriptions:     fluticasone (FLONASE) 50 mcg/act nasal spray, 1 spray into each nostril 2 (two) times a day, Disp: , Rfl:     hydrochlorothiazide (HYDRODIURIL) 12 5 mg tablet, TAKE 1 TABLET AS NEEDED FOR SWELLING OF HANDS AS NEEDED, Disp: , Rfl: 0    LORazepam (ATIVAN) 1 mg tablet, May take 1 pill twice a day as needed for anxiety  P r n , Disp: 90 tablet, Rfl: 1    OLANZapine (ZyPREXA ZYDIS) 5 mg dispersible tablet, Take 1 tablet (5 mg total) by mouth daily at bedtime for 90 days, Disp: 90 tablet, Rfl: 0    thyroid (ARMOUR THYROID) 120 MG tablet, Take by mouth, Disp: , Rfl:     thyroid (ARMOUR THYROID) 90 MG tablet, Take by mouth, Disp: , Rfl:     baclofen 20 mg tablet, TAKE 1 TABLET 3 TIMES A DAY AS NEEDED FOR MUSCLE SPASMS FOR UP TO 30 DAYS (Patient not taking: Reported on 11/8/2018), Disp: 60 tablet, Rfl: 1    diazepam (VALIUM) 10 mg tablet, Take 1 pill 0 5 hr prior to MRI (Patient not taking: Reported on 11/8/2018 ), Disp: 5 tablet, Rfl: 0    eszopiclone (LUNESTA) 3 MG tablet, Take 1 pill half an hour before bedtime p r n , Disp: 90 tablet, Rfl: 1    montelukast (SINGULAIR) 10 mg tablet, Take 1 tablet by mouth daily, Disp: , Rfl:     sulfamethoxazole-trimethoprim (BACTRIM DS) 800-160 mg per tablet, Take 1 tablet by mouth every 12 (twelve) hours for 10 days, Disp: 20 tablet, Rfl: 0    Allergies   Allergen Reactions    Levofloxacin      Annotation - [id number]: Joint pain    Other Other (See Comments)    Tizanidine           Lab Review   No visits with results within 2 Month(s) from this visit  Latest known visit with results is:   [facility] Office Visit on 08/29/2017   Component Date Value    Color, UA 08/29/2017 Clear     Clarity, UA 08/29/2017 Transparent     Leukocytes, UA 08/29/2017 negative     Nitrite, UA 08/29/2017 negative     Blood, UA 08/29/2017 5-10     Bilirubin, UA 08/29/2017 negative     Urobilinogen, UA 08/29/2017 negative     Protein, UA 08/29/2017 6 0     pH, UA 08/29/2017 5 0     Specific Gravity, UA 08/29/2017 1 010     Ketones, UA 08/29/2017 negative     Glucose 08/29/2017 negative         Imaging: No results found  Objective:     Physical Exam   Constitutional: She appears well-developed and well-nourished  HENT:   Head: Normocephalic and atraumatic  Mouth/Throat: Oropharynx is clear and moist    Ear tubes noted bilaterally  Right tm  with blood behind the tm with effusion noted  Eyes: Pupils are equal, round, and reactive to light  Conjunctivae and EOM are normal    Neck: Normal range of motion  Neck supple  Cardiovascular: Normal rate and regular rhythm  Pulmonary/Chest: Effort normal  No respiratory distress  She has no wheezes  Musculoskeletal: Normal range of motion  Skin:   Raised red skin lesion approx 10 mm noted under the left buttock  Feels fluctuant with small white areas on the lesion  Psychiatric: She has a normal mood and affect  Her behavior is normal  Judgment and thought content normal          Patient Instructions   It is not clear that this lesion is a boil  I tend to think it is an infected skin lesion  I would like you to take the Bactrim twice daily for the next 10 days  Warm soak the area with either a warm wet tab all as warm as tolerable every 4 hours for about 20 minutes for the next week  If it drains and feels better that is fine  If the lesion persists I would like you to make an appointment in 2 weeks for removal of lesion and a biopsy  I question an infected keloid  Keep the appt with Dr Kristine Duarte for the ear recheck         ANNALEE Reddy

## 2018-11-12 ENCOUNTER — TELEPHONE (OUTPATIENT)
Dept: FAMILY MEDICINE CLINIC | Facility: CLINIC | Age: 48
End: 2018-11-12

## 2018-11-26 NOTE — TELEPHONE ENCOUNTER
Patient called in regards to meds    She a script was sent in October for this med and one in August for the ativan with a refill     Patient was advised to check pharmacy for script    Patient has an appt with Gladis Dale tomorrow and will discuss chente

## 2018-11-26 NOTE — TELEPHONE ENCOUNTER
I can't make out what I am supposed to do with this  I saw the pt once for a boil  Looks like she has an appt tomorrow  What am I supposed to do with the ativan and the zyprexa?

## 2018-11-30 ENCOUNTER — OFFICE VISIT (OUTPATIENT)
Dept: FAMILY MEDICINE CLINIC | Facility: CLINIC | Age: 48
End: 2018-11-30
Payer: COMMERCIAL

## 2018-11-30 VITALS
OXYGEN SATURATION: 98 % | HEART RATE: 77 BPM | HEIGHT: 60 IN | WEIGHT: 172.6 LBS | BODY MASS INDEX: 33.89 KG/M2 | SYSTOLIC BLOOD PRESSURE: 128 MMHG | DIASTOLIC BLOOD PRESSURE: 76 MMHG

## 2018-11-30 DIAGNOSIS — S46.812A TRAPEZIUS STRAIN, LEFT, INITIAL ENCOUNTER: Primary | ICD-10-CM

## 2018-11-30 DIAGNOSIS — F41.9 ANXIETY: ICD-10-CM

## 2018-11-30 PROCEDURE — 1036F TOBACCO NON-USER: CPT | Performed by: FAMILY MEDICINE

## 2018-11-30 PROCEDURE — 99213 OFFICE O/P EST LOW 20 MIN: CPT | Performed by: FAMILY MEDICINE

## 2018-11-30 PROCEDURE — 3008F BODY MASS INDEX DOCD: CPT | Performed by: FAMILY MEDICINE

## 2018-11-30 RX ORDER — CYCLOBENZAPRINE HCL 10 MG
10 TABLET ORAL
Qty: 30 TABLET | Refills: 0 | Status: SHIPPED | OUTPATIENT
Start: 2018-11-30 | End: 2019-07-31

## 2018-11-30 NOTE — PATIENT INSTRUCTIONS
Discussed all with patient  Skin cyst has resolved it is looking like it is healing very well  Will add sertraline 1/2 pill daily for the next 3 days then 1 full pill daily as you have anxiety every day  If you have a problem on this medication call here in speak to me do not stop the medicine unless you have an allergic reaction which you are not going to have  Try to hold the lorazepam for now as I am putting you on cyclobenzaprine about a half an hour before bed which will make you very tired  I also suggest warm wet compresses to the neck as much as possible to loosen up those neck muscles  You may need to get a mouth guard

## 2018-11-30 NOTE — PROGRESS NOTES
Assessment/Plan:     Chronic Problems:  No problem-specific Assessment & Plan notes found for this encounter  Visit Diagnosis:  Diagnoses and all orders for this visit:    Trapezius strain, left, initial encounter  -     cyclobenzaprine (FLEXERIL) 10 mg tablet; Take 1 tablet (10 mg total) by mouth daily at bedtime as needed for muscle spasms    Anxiety  Comments: Will add sertraline 1/2 pill po qd x 3 days and then 1 day  Follow here in 3 weeks  Hold the lorazepam    Orders:  -     sertraline (ZOLOFT) 50 mg tablet; Take 1/2 pill daily for 3 days and then 1 daily          Subjective:    Patient ID: Gabby Lee is a 57 y o  female  Pt is here for f/u on her infected skin lesion on the posterior left thigh  Finished the bactrim and the area feels better  Thinks she has a sinus infection, but not sure if she is grinding her teeth  Home sick on Thursday and Wednesday with chills but is better today  Still has pains in her jaws bilaterally and pain in both sides of her anterior neck  Feels her anxiety is over the top  Using ativan but no relief of anxiety  Has been on olanzapine for anxiety by [facility]  Feels the anxiety is worse over the past 3 to 4 weeks  Dad passed away in June, other than that no new problems  Takes all other meds as directed  No side effects noted  The following portions of the patient's history were reviewed and updated as appropriate: allergies, current medications, past family history, past medical history, past social history, past surgical history and problem list     Review of Systems   Constitutional: Negative for chills, diaphoresis, fatigue and fever  HENT: Positive for congestion, ear pain (bilateral), rhinorrhea, sinus pain and sinus pressure  Eyes: Negative  Respiratory: Positive for cough (but intermittent dry cough)  Negative for shortness of breath and wheezing  Cardiovascular: Negative for chest pain and palpitations  Gastrointestinal: Negative  Endocrine:        Felt hot and cold and achy on Wednesday  Had her flu shot  Genitourinary: Negative  Musculoskeletal: Positive for myalgias (pain from the left neck to the the shoulder  Started on Tuesday  )  Neurological: Negative for dizziness, light-headedness and headaches  Psychiatric/Behavioral: Negative for dysphoric mood and suicidal ideas  The patient is nervous/anxious  Pt reports that she has anxiety every day  Does not even want to leave the house  No depression  /76   Pulse 77   Ht 5' (1 524 m)   Wt 78 3 kg (172 lb 9 6 oz)   SpO2 98%   BMI 33 71 kg/m²   Social History     Social History    Marital status: /Civil Union     Spouse name: N/A    Number of children: N/A    Years of education: N/A     Occupational History    Not on file       Social History Main Topics    Smoking status: Never Smoker    Smokeless tobacco: Never Used      Comment: former smoker noted in "allscripts"    Alcohol use Yes      Comment: Rarely     Drug use: Unknown    Sexual activity: Not on file     Other Topics Concern    Not on file     Social History Narrative    No narrative on file     Past Medical History:   Diagnosis Date    Depression with anxiety     last assessed: April 11, 2017    Hypothyroidism     last assessed: Aug 29, 2017    Persistent insomnia     last assessed: Dec 21, 2016     Family History   Problem Relation Age of Onset    Adopted: Yes     Past Surgical History:   Procedure Laterality Date    MYRINGOTOMY W/ TUBES Left     SINUS SURGERY         Current Outpatient Prescriptions:     cyclobenzaprine (FLEXERIL) 10 mg tablet, Take 1 tablet (10 mg total) by mouth daily at bedtime as needed for muscle spasms, Disp: 30 tablet, Rfl: 0    hydrochlorothiazide (HYDRODIURIL) 12 5 mg tablet, TAKE 1 TABLET AS NEEDED FOR SWELLING OF HANDS AS NEEDED, Disp: , Rfl: 0    LORazepam (ATIVAN) 1 mg tablet, May take 1 pill twice a day as needed for anxiety  P r n , Disp: 90 tablet, Rfl: 1    OLANZapine (ZyPREXA ZYDIS) 5 mg dispersible tablet, Take 1 tablet (5 mg total) by mouth daily at bedtime for 90 days, Disp: 90 tablet, Rfl: 0    sertraline (ZOLOFT) 50 mg tablet, Take 1/2 pill daily for 3 days and then 1 daily, Disp: 30 tablet, Rfl: 0    thyroid (ARMOUR THYROID) 120 MG tablet, Take by mouth, Disp: , Rfl:     thyroid (ARMOUR THYROID) 90 MG tablet, Take by mouth, Disp: , Rfl:     Allergies   Allergen Reactions    Levofloxacin      Annotation - [id number]: Joint pain    Other Other (See Comments)    Tizanidine           Lab Review   No visits with results within 6 Month(s) from this visit  Latest known visit with results is:   [facility] Office Visit on 08/29/2017   Component Date Value    Color, UA 08/29/2017 Clear     Clarity, UA 08/29/2017 Transparent     Leukocytes, UA 08/29/2017 negative     Nitrite, UA 08/29/2017 negative     Blood, UA 08/29/2017 5-10     Bilirubin, UA 08/29/2017 negative     Urobilinogen, UA 08/29/2017 negative     Protein, UA 08/29/2017 6 0     pH, UA 08/29/2017 5 0     Specific Gravity, UA 08/29/2017 1 010     Ketones, UA 08/29/2017 negative     Glucose 08/29/2017 negative         Imaging: No results found  Objective:     Physical Exam   Constitutional: She is oriented to person, place, and time  She appears well-developed and well-nourished  No distress  HENT:   Head: Normocephalic and atraumatic  Mouth/Throat: Oropharynx is clear and moist    Tubes noted in bilateral ears  Still with blood in the canal on the right, just seen by Dr Apurva Rueda and was told all was good  Slight jaw clicking noted bilaterally  Eyes: Pupils are equal, round, and reactive to light  Conjunctivae and EOM are normal  Right eye exhibits no discharge  Left eye exhibits no discharge  Neck: Normal range of motion  Neck supple  Very tender over the left trapezius   Full rom to neck    Cardiovascular: Normal rate, regular rhythm and normal heart sounds  Exam reveals no friction rub  No murmur heard  Pulmonary/Chest: Effort normal and breath sounds normal  No respiratory distress  She has no wheezes  She has no rales  Abdominal: Bowel sounds are normal    Musculoskeletal: Normal range of motion  She exhibits no edema, tenderness or deformity  Neurological: She is alert and oriented to person, place, and time  No cranial nerve deficit  Skin: Skin is warm and dry  No rash noted  She is not diaphoretic  Skin lesion left upper anterior thigh no longer infected  Appears to be a healing cyst area  Psychiatric: She has a normal mood and affect  Her behavior is normal  Judgment and thought content normal          Patient Instructions   Discussed all with patient  Skin cyst has resolved it is looking like it is healing very well  Will add sertraline 1/2 pill daily for the next 3 days then 1 full pill daily as you have anxiety every day  If you have a problem on this medication call here in speak to me do not stop the medicine unless you have an allergic reaction which you are not going to have  Try to hold the lorazepam for now as I am putting you on cyclobenzaprine about a half an hour before bed which will make you very tired  I also suggest warm wet compresses to the neck as much as possible to loosen up those neck muscles  You may need to get a mouth guard         ANNALEE Reddy

## 2018-12-11 ENCOUNTER — OFFICE VISIT (OUTPATIENT)
Dept: FAMILY MEDICINE CLINIC | Facility: CLINIC | Age: 48
End: 2018-12-11
Payer: COMMERCIAL

## 2018-12-11 VITALS
BODY MASS INDEX: 33.57 KG/M2 | OXYGEN SATURATION: 98 % | SYSTOLIC BLOOD PRESSURE: 150 MMHG | TEMPERATURE: 99.2 F | WEIGHT: 171 LBS | HEIGHT: 60 IN | RESPIRATION RATE: 18 BRPM | HEART RATE: 89 BPM | DIASTOLIC BLOOD PRESSURE: 92 MMHG

## 2018-12-11 DIAGNOSIS — F06.4 ANXIETY DISORDER DUE TO MEDICAL CONDITION: ICD-10-CM

## 2018-12-11 DIAGNOSIS — M62.838 MUSCLE SPASM: Primary | ICD-10-CM

## 2018-12-11 PROCEDURE — 3008F BODY MASS INDEX DOCD: CPT | Performed by: FAMILY MEDICINE

## 2018-12-11 PROCEDURE — 99213 OFFICE O/P EST LOW 20 MIN: CPT | Performed by: FAMILY MEDICINE

## 2018-12-11 PROCEDURE — 1036F TOBACCO NON-USER: CPT | Performed by: FAMILY MEDICINE

## 2018-12-11 NOTE — PROGRESS NOTES
Assessment/Plan:         Diagnoses and all orders for this visit:    Muscle spasm    Anxiety disorder due to medical condition        Muscle spasm, left trapezius  Discussed plan of care recommended local massage consider chiropractic, recommended Biofreeze icy Hot sports cream, reassured nonnodular  Anxiety  Encouraged to continue current plan of care maintain follow-up        Subjective:      Patient ID: Reyes Genao is a 57 y o  female  I think getting myself worked up  Could be medicines but I have a dry mouth, and feels swelling to the side of my neck, I do of thyroid problems, concerned about the nodules and lymph nodes, very stressed at work  Can stop massaging the side of my neck, feels muscular increases when bending my head to the opposite direction feels tender better with massage  Just started on new medicines for anxiety, also on medicines for muscle aches, but I am still working at the job which is still stressful  Taking thyroid medicines no changes  Negative fever chills sore throat weight loss weight gain, negative swallowing problems        The following portions of the patient's history were reviewed and updated as appropriate:   She has a past medical history of Depression with anxiety; Hypothyroidism; and Persistent insomnia ,   does not have any pertinent problems on file  ,   has a past surgical history that includes Myringotomy w/ tubes (Left) and Sinus surgery  ,  family history is not on file  She was adopted  ,   reports that she has never smoked  She has never used smokeless tobacco  She reports that she drinks alcohol  Her drug history is not on file  ,  is allergic to levofloxacin; other; and tizanidine         Review of Systems   Constitutional: Negative for appetite change, chills, fever and unexpected weight change     HENT: Negative for congestion, dental problem, ear pain, hearing loss, postnasal drip, rhinorrhea, sinus pain, sinus pressure, sneezing, sore throat, tinnitus and voice change  Eyes: Negative for visual disturbance  Respiratory: Negative for apnea, cough, chest tightness and shortness of breath  Cardiovascular: Negative for chest pain, palpitations and leg swelling  Gastrointestinal: Negative for abdominal pain, blood in stool, constipation, diarrhea, nausea and vomiting  Endocrine: Negative for cold intolerance, heat intolerance, polydipsia, polyphagia and polyuria  Genitourinary: Negative for decreased urine volume, difficulty urinating, dysuria, frequency and hematuria  Musculoskeletal: Positive for myalgias  Negative for arthralgias, back pain, gait problem and joint swelling  Skin: Negative for color change, rash and wound  Allergic/Immunologic: Negative for environmental allergies and food allergies  Neurological: Negative for dizziness, syncope, weakness, light-headedness, numbness and headaches  Hematological: Negative for adenopathy  Does not bruise/bleed easily  Psychiatric/Behavioral: Negative for sleep disturbance and suicidal ideas  The patient is nervous/anxious  Objective:  Vitals:    12/11/18 1558   BP: 150/92   Pulse: 89   Resp: 18   Temp: 99 2 °F (37 3 °C)   SpO2: 98%      Physical Exam   Constitutional: She is oriented to person, place, and time  She appears well-developed and well-nourished  No distress  HENT:   Head: Normocephalic and atraumatic  Right Ear: External ear normal    Left Ear: External ear normal    Eyes: Conjunctivae and EOM are normal  No scleral icterus  Neck: Normal range of motion  Neck supple  No thyromegaly present  Cardiovascular: Normal rate, regular rhythm and normal heart sounds  Pulmonary/Chest: Effort normal and breath sounds normal    Abdominal: Soft  Bowel sounds are normal    Musculoskeletal: Normal range of motion  Arms:  Left trapezius mild spasm tenderness, increasing with side bending or rotation   Lymphadenopathy:     She has no cervical adenopathy     Neurological: She is alert and oriented to person, place, and time  She has normal reflexes  Skin: Skin is warm and dry  Psychiatric: Her behavior is normal  Judgment and thought content normal  Her mood appears anxious

## 2018-12-21 DIAGNOSIS — Q05.7 SPINA BIFIDA OF LUMBOSACRAL REGION WITHOUT HYDROCEPHALUS (HCC): ICD-10-CM

## 2018-12-28 ENCOUNTER — OFFICE VISIT (OUTPATIENT)
Dept: FAMILY MEDICINE CLINIC | Facility: CLINIC | Age: 48
End: 2018-12-28
Payer: COMMERCIAL

## 2018-12-28 VITALS
OXYGEN SATURATION: 95 % | DIASTOLIC BLOOD PRESSURE: 82 MMHG | HEIGHT: 60 IN | SYSTOLIC BLOOD PRESSURE: 120 MMHG | WEIGHT: 170.6 LBS | HEART RATE: 90 BPM | BODY MASS INDEX: 33.49 KG/M2

## 2018-12-28 DIAGNOSIS — F41.9 ANXIETY: Primary | ICD-10-CM

## 2018-12-28 DIAGNOSIS — F41.9 ANXIETY: ICD-10-CM

## 2018-12-28 DIAGNOSIS — R59.1 LYMPHADENOPATHY OF HEAD AND NECK: ICD-10-CM

## 2018-12-28 PROCEDURE — 99213 OFFICE O/P EST LOW 20 MIN: CPT | Performed by: FAMILY MEDICINE

## 2018-12-28 PROCEDURE — 1036F TOBACCO NON-USER: CPT | Performed by: FAMILY MEDICINE

## 2018-12-28 NOTE — ASSESSMENT & PLAN NOTE
Stay on the sertraline and will follow in 4 weeks  Stop touching the neck I suspect this is now a habit

## 2018-12-28 NOTE — PATIENT INSTRUCTIONS
Discussed with patient  There seems to be some benefit from staying on the sertraline as you are less stressed at work and you are no longer grinding her teeth  I believe constantly touching that neck is just out of habit  The but I will get an ultrasound of the neck to rule out adenopathy and I will call with results  Stay on the sertraline follow-up here in 1 month as we may want to increase the dose

## 2018-12-28 NOTE — PROGRESS NOTES
Assessment/Plan:     Chronic Problems:  Anxiety  Stay on the sertraline and will follow in 4 weeks  Stop touching the neck I suspect this is now a habit  Visit Diagnosis:  Diagnoses and all orders for this visit:    Anxiety  -     sertraline (ZOLOFT) 50 mg tablet; Take 1 pill daily    Lymphadenopathy of head and neck  Comments:  Suspect muscle but will r/o adenopathy  Orders:  -     Cancel: US thyroid; Future  -     US head neck soft tissue; Future    Anxiety  Comments: Will add sertraline 1/2 pill po qd x 3 days and then 1 day  Follow here in 3 weeks  Hold the lorazepam    Orders:  -     sertraline (ZOLOFT) 50 mg tablet; Take 1 pill daily          Subjective:    Patient ID: Sagrario Redding is a 57 y o  female  Pt is here for f/u on her trapezius strain on the left and med changes  Took flexeril for a while with no relief, but is no longer grinding her teeth  Mouth feels dry  And feels she has swelling on the left side of the anterior neck  Feels better when she rubs her neck  Feels she is constantly touching that area  Does not feel any mass over there  Thinks it is a muscle, but can't keep her hand off it  Had an adjustment and it felt better, but still can't stop touching the area  Started the serrtaline and not sure if this has helped her anxiety yet  Feels less stressed at work and not grinding her teeth  Takes all other meds as directed  No side effects noted  The following portions of the patient's history were reviewed and updated as appropriate: allergies, current medications, past family history, past medical history, past social history, past surgical history and problem list     Review of Systems   Constitutional: Negative for chills, diaphoresis, fatigue and fever  HENT: Negative for congestion, ear pain, sneezing and sore throat  Has dry mouth  Respiratory: Negative for cough, shortness of breath and wheezing      Cardiovascular: Negative for chest pain and palpitations  Gastrointestinal: Negative  Genitourinary: Negative  Musculoskeletal:        Eva Colvin' say if there is pain on the right side of her neck as she keeps palpating the area  Neurological: Negative for dizziness, light-headedness and headaches  Psychiatric/Behavioral: Negative for hallucinations  The patient is nervous/anxious  /82   Pulse 90   Ht 5' (1 524 m)   Wt 77 4 kg (170 lb 9 6 oz)   SpO2 95%   BMI 33 32 kg/m²   Social History     Social History    Marital status: /Civil Union     Spouse name: N/A    Number of children: N/A    Years of education: N/A     Occupational History    Not on file       Social History Main Topics    Smoking status: Never Smoker    Smokeless tobacco: Never Used      Comment: former smoker noted in "allscripts"    Alcohol use Yes      Comment: Rarely     Drug use: Unknown    Sexual activity: Not on file     Other Topics Concern    Not on file     Social History Narrative    No narrative on file     Past Medical History:   Diagnosis Date    Depression with anxiety     last assessed: April 11, 2017    Hypothyroidism     last assessed: Aug 29, 2017    Persistent insomnia     last assessed: Dec 21, 2016     Family History   Problem Relation Age of Onset    Adopted: Yes     Past Surgical History:   Procedure Laterality Date    MYRINGOTOMY W/ TUBES Left     SINUS SURGERY         Current Outpatient Prescriptions:     cyclobenzaprine (FLEXERIL) 10 mg tablet, Take 1 tablet (10 mg total) by mouth daily at bedtime as needed for muscle spasms, Disp: 30 tablet, Rfl: 0    hydrochlorothiazide (HYDRODIURIL) 12 5 mg tablet, TAKE 1 TABLET AS NEEDED FOR SWELLING OF HANDS AS NEEDED, Disp: , Rfl: 0    LORazepam (ATIVAN) 1 mg tablet, May take 1 pill twice a day as needed for anxiety  P r n , Disp: 90 tablet, Rfl: 1    OLANZapine (ZyPREXA ZYDIS) 5 mg dispersible tablet, Take 1 tablet (5 mg total) by mouth daily at bedtime for 90 days, Disp: 90 tablet, Rfl: 0    sertraline (ZOLOFT) 50 mg tablet, Take 1 pill daily, Disp: 30 tablet, Rfl: 0    thyroid (ARMOUR THYROID) 120 MG tablet, Take by mouth, Disp: , Rfl:     thyroid (ARMOUR THYROID) 90 MG tablet, Take by mouth, Disp: , Rfl:     Allergies   Allergen Reactions    Levofloxacin      Annotation - [id number]: Joint pain    Other Other (See Comments)    Tizanidine           Lab Review   No visits with results within 2 Month(s) from this visit  Latest known visit with results is:   [facility] Office Visit on 08/29/2017   Component Date Value    Color, UA 08/29/2017 Clear     Clarity, UA 08/29/2017 Transparent     Leukocytes, UA 08/29/2017 negative     Nitrite, UA 08/29/2017 negative     Blood, UA 08/29/2017 5-10     Bilirubin, UA 08/29/2017 negative     Urobilinogen, UA 08/29/2017 negative     Protein, UA 08/29/2017 6 0     pH, UA 08/29/2017 5 0     Specific Gravity, UA 08/29/2017 1 010     Ketones, UA 08/29/2017 negative     Glucose 08/29/2017 negative         Imaging: No results found  Objective:     Physical Exam   Constitutional: She is oriented to person, place, and time  She appears well-developed and well-nourished  No distress  HENT:   Head: Normocephalic and atraumatic  Right Ear: External ear normal    Left Ear: External ear normal    Mouth/Throat: Oropharynx is clear and moist    Eyes: Pupils are equal, round, and reactive to light  Conjunctivae and EOM are normal  Right eye exhibits no discharge  Left eye exhibits no discharge  Neck: Normal range of motion  Neck supple  Cardiovascular: Normal rate, regular rhythm and normal heart sounds  Exam reveals no friction rub  No murmur heard  Pulmonary/Chest: Effort normal and breath sounds normal  No respiratory distress  She has no wheezes  She has no rales  Abdominal: Soft  Bowel sounds are normal  There is no tenderness  Musculoskeletal: Normal range of motion  She exhibits no edema, tenderness or deformity  Lymphadenopathy:     She has no cervical adenopathy (but patient is constantly touching the left anterior chain  )  Neurological: She is alert and oriented to person, place, and time  No cranial nerve deficit  Skin: Skin is warm and dry  No rash noted  She is not diaphoretic  Psychiatric: She has a normal mood and affect  Her behavior is normal  Judgment and thought content normal          Patient Instructions   Discussed with patient  There seems to be some benefit from staying on the sertraline as you are less stressed at work and you are no longer grinding her teeth  I believe constantly touching that neck is just out of habit  The but I will get an ultrasound of the neck to rule out adenopathy and I will call with results  Stay on the sertraline follow-up here in 1 month as we may want to increase the dose  ANNALEE Peña    Portions of the record may have been created with voice recognition software   Occasional wrong word or "sound a like" substitutions may have occurred due to the inherent limitations of voice recognition software   Read the chart carefully and recognize, using context, where substitutions have occurred

## 2019-01-11 ENCOUNTER — TELEPHONE (OUTPATIENT)
Dept: FAMILY MEDICINE CLINIC | Facility: CLINIC | Age: 49
End: 2019-01-11

## 2019-01-11 DIAGNOSIS — M54.2 NECK PAIN: Primary | ICD-10-CM

## 2019-01-11 NOTE — TELEPHONE ENCOUNTER
patient wanted to know what did you want her to do about the Zoloft   Did you want to try another med or wait until she comes in on the 24 th?

## 2019-01-11 NOTE — TELEPHONE ENCOUNTER
Dinesh Sandhu called she is requesting to speak with you  Wouldn't really explain the reason  She is currently at work so she asked if you can call her work phone

## 2019-01-11 NOTE — TELEPHONE ENCOUNTER
Spoke with patient she said the Zoloft was making her feel like she was crawling out of her skin  She just couldn't handle it so she stop taking it  She also is saying her neck is still bothering her  The pain is now in the back area of her neck and still on the side  She si asking if she can have an x-ray of the neck done   She knows that us was good

## 2019-01-14 ENCOUNTER — TELEPHONE (OUTPATIENT)
Dept: FAMILY MEDICINE CLINIC | Facility: CLINIC | Age: 49
End: 2019-01-14

## 2019-01-14 NOTE — TELEPHONE ENCOUNTER
Spoke with patient she is aware of her results  she said she still is with the pain  She thinks it is tension because she had a deep tissue massage which helped her a lot and then today the tension started back  She has been using warm compression and instead ice which helps       She has a af/u with you on 1/24/2019

## 2019-02-04 DIAGNOSIS — F41.9 ANXIETY: ICD-10-CM

## 2019-02-04 RX ORDER — LORAZEPAM 1 MG/1
TABLET ORAL
Qty: 30 TABLET | Refills: 1 | Status: SHIPPED | OUTPATIENT
Start: 2019-02-04 | End: 2019-03-06 | Stop reason: ALTCHOICE

## 2019-03-06 ENCOUNTER — OFFICE VISIT (OUTPATIENT)
Dept: FAMILY MEDICINE CLINIC | Facility: CLINIC | Age: 49
End: 2019-03-06
Payer: COMMERCIAL

## 2019-03-06 VITALS
SYSTOLIC BLOOD PRESSURE: 124 MMHG | HEART RATE: 88 BPM | OXYGEN SATURATION: 98 % | DIASTOLIC BLOOD PRESSURE: 86 MMHG | BODY MASS INDEX: 32 KG/M2 | WEIGHT: 163 LBS | HEIGHT: 60 IN

## 2019-03-06 DIAGNOSIS — E03.9 ACQUIRED HYPOTHYROIDISM: ICD-10-CM

## 2019-03-06 DIAGNOSIS — M62.838 MUSCLE SPASM: ICD-10-CM

## 2019-03-06 DIAGNOSIS — E78.2 MIXED HYPERLIPIDEMIA: ICD-10-CM

## 2019-03-06 DIAGNOSIS — G44.229 CHRONIC TENSION-TYPE HEADACHE, NOT INTRACTABLE: Primary | ICD-10-CM

## 2019-03-06 DIAGNOSIS — G47.00 INSOMNIA, UNSPECIFIED TYPE: ICD-10-CM

## 2019-03-06 DIAGNOSIS — F41.9 ANXIETY: ICD-10-CM

## 2019-03-06 DIAGNOSIS — R53.82 CHRONIC FATIGUE: ICD-10-CM

## 2019-03-06 DIAGNOSIS — Z12.11 SCREENING FOR COLON CANCER: ICD-10-CM

## 2019-03-06 DIAGNOSIS — I10 BENIGN HYPERTENSION: ICD-10-CM

## 2019-03-06 DIAGNOSIS — E55.9 HYPOVITAMINOSIS D: ICD-10-CM

## 2019-03-06 PROCEDURE — 99214 OFFICE O/P EST MOD 30 MIN: CPT | Performed by: FAMILY MEDICINE

## 2019-03-06 PROCEDURE — 1036F TOBACCO NON-USER: CPT | Performed by: FAMILY MEDICINE

## 2019-03-06 PROCEDURE — 3008F BODY MASS INDEX DOCD: CPT | Performed by: FAMILY MEDICINE

## 2019-03-06 PROCEDURE — 3079F DIAST BP 80-89 MM HG: CPT | Performed by: FAMILY MEDICINE

## 2019-03-06 PROCEDURE — 3074F SYST BP LT 130 MM HG: CPT | Performed by: FAMILY MEDICINE

## 2019-03-06 RX ORDER — OLANZAPINE 5 MG/1
5 TABLET, ORALLY DISINTEGRATING ORAL
Qty: 90 TABLET | Refills: 0 | Status: SHIPPED | OUTPATIENT
Start: 2019-03-06 | End: 2019-03-06 | Stop reason: SDUPTHER

## 2019-03-06 RX ORDER — OLANZAPINE 5 MG/1
5 TABLET, ORALLY DISINTEGRATING ORAL
Qty: 90 TABLET | Refills: 0 | Status: SHIPPED | OUTPATIENT
Start: 2019-03-06 | End: 2019-07-31 | Stop reason: SDUPTHER

## 2019-03-06 RX ORDER — ESZOPICLONE 3 MG/1
3 TABLET, FILM COATED ORAL
Qty: 30 TABLET | Refills: 3 | Status: SHIPPED | OUTPATIENT
Start: 2019-03-06 | End: 2019-07-17 | Stop reason: SDUPTHER

## 2019-03-06 RX ORDER — TOPIRAMATE 50 MG/1
TABLET, FILM COATED ORAL
Qty: 180 TABLET | Refills: 1 | Status: SHIPPED | OUTPATIENT
Start: 2019-03-06 | End: 2019-07-31

## 2019-03-06 NOTE — PROGRESS NOTES
Standard Visit   Assessment/Plan:     Visit Diagnosis:  Diagnoses and all orders for this visit:    Chronic tension-type headache, not intractable  -     topiramate (TOPAMAX) 50 MG tablet; Take 1 pill twice a day    Anxiety  -     Discontinue: OLANZapine (ZyPREXA ZYDIS) 5 mg dispersible tablet; Take 1 tablet (5 mg total) by mouth daily at bedtime for 90 days  -     OLANZapine (ZyPREXA ZYDIS) 5 mg dispersible tablet; Take 1 tablet (5 mg total) by mouth daily at bedtime for 90 days    Muscle spasm    Hypovitaminosis D  -     Vitamin D 25 hydroxy; Future    Acquired hypothyroidism  -     TSH, 3rd generation with Free T4 reflex; Future    Mixed hyperlipidemia  -     Lipid panel; Future  -     Comprehensive metabolic panel; Future    Chronic fatigue  -     CBC and differential; Future  -     Vitamin D 25 hydroxy; Future    Screening for colon cancer  -     Occult Blood, Fecal Immunochemical; Future    Benign hypertension  -     Microalbumin / creatinine urine ratio    Insomnia, unspecified type  -     eszopiclone (LUNESTA) 3 MG tablet; Take 1 tablet (3 mg total) by mouth daily at bedtime as needed for sleep Take immediately before bedtime    Start the topamax by taking 1/2 pill at night for one week  After that, take 1/2 pill morning and night for one week  Then,  you take a whole pill morning and night  You can continue to take her Lunesta prn just as you are doing  I am  filling your Zyprexa that you can use during the day for occasions of anxiety as you have been using that for several years with good results  You can also use the Zyprexa at night for teeth grinding as that works very well for you  Get the  fasting blood work done  Call me and let me know if you are due for your DEXA scan  Get the  mammo done        Subjective:    Patient ID: Claudia Brady is a 57 y o  female  Patient is here with the following concerns:  Needs meds refilled    Is out of her zyprexa that she uses on a prn basis for anxiety and teeth grinding  This med has worked very well for her for several years  Dentist has been very pleased with the status of her teeth  She still needs to have her mammo done  She has the script  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, but is looking to switch to [facility]  [facility] for hypothyroidism  Benign HTN treated to goal with HCTZ  She stopped the zoloft as she did not like the way it made her feel         HPI    The following portions of the patient's history were reviewed and updated as appropriate: allergies, current medications, past family history, past medical history, past social history, past surgical history and problem list     Review of Systems      /86   Pulse 88   Ht 5' (1 524 m)   Wt 73 9 kg (163 lb)   SpO2 98%   BMI 31 83 kg/m²   Social History     Socioeconomic History    Marital status: /Civil Union     Spouse name: Not on file    Number of children: Not on file    Years of education: Not on file    Highest education level: Not on file   Occupational History    Not on file   Social Needs    Financial resource strain: Not on file    Food insecurity:     Worry: Not on file     Inability: Not on file    Transportation needs:     Medical: Not on file     Non-medical: Not on file   Tobacco Use    Smoking status: Never Smoker    Smokeless tobacco: Never Used    Tobacco comment: former smoker noted in "allscripts"   Substance and Sexual Activity    Alcohol use: Yes     Comment: Rarely     Drug use: Not on file    Sexual activity: Not on file   Lifestyle    Physical activity:     Days per week: Not on file     Minutes per session: Not on file    Stress: Not on file   Relationships    Social connections:     Talks on phone: Not on file     Gets together: Not on file     Attends [employer] service: Not on file     Active member of club or organization: Not on file     Attends meetings of clubs or organizations: Not on file     Relationship status: Not on file    Intimate partner violence:     Fear of current or ex partner: Not on file     Emotionally abused: Not on file     Physically abused: Not on file     Forced sexual activity: Not on file   Other Topics Concern    Not on file   Social History Narrative    Not on file     Past Medical History:   Diagnosis Date    Depression with anxiety     last assessed: April 11, 2017    Hypothyroidism     last assessed: Aug 29, 2017    Persistent insomnia     last assessed: Dec 21, 2016     Family History   Adopted: Yes     Past Surgical History:   Procedure Laterality Date    MYRINGOTOMY W/ TUBES Left     SINUS SURGERY         Current Outpatient Medications:     thyroid (ARMOUR THYROID) 120 MG tablet, Take by mouth, Disp: , Rfl:     cyclobenzaprine (FLEXERIL) 10 mg tablet, Take 1 tablet (10 mg total) by mouth daily at bedtime as needed for muscle spasms (Patient not taking: Reported on 3/6/2019), Disp: 30 tablet, Rfl: 0    eszopiclone (LUNESTA) 3 MG tablet, Take 1 tablet (3 mg total) by mouth daily at bedtime as needed for sleep Take immediately before bedtime, Disp: 30 tablet, Rfl: 3    hydrochlorothiazide (HYDRODIURIL) 12 5 mg tablet, TAKE 1 TABLET AS NEEDED FOR SWELLING OF HANDS AS NEEDED, Disp: , Rfl: 0    OLANZapine (ZyPREXA ZYDIS) 5 mg dispersible tablet, Take 1 tablet (5 mg total) by mouth daily at bedtime for 90 days, Disp: 90 tablet, Rfl: 0    thyroid (ARMOUR THYROID) 90 MG tablet, Take by mouth, Disp: , Rfl:     topiramate (TOPAMAX) 50 MG tablet, Take 1 pill twice a day, Disp: 180 tablet, Rfl: 1      Objective:     Physical Exam   Constitutional: She is oriented to person, place, and time  She appears well-developed and well-nourished  HENT:   Head: Normocephalic and atraumatic  Eyes: Pupils are equal, round, and reactive to light  Neck: Normal range of motion  Neck supple  Cardiovascular: Normal rate     Pulmonary/Chest: Effort normal and breath sounds normal    Abdominal: Soft  Musculoskeletal: Normal range of motion  Neurological: She is alert and oriented to person, place, and time  Skin: Skin is warm and dry  Psychiatric: She has a normal mood and affect  Nursing note and vitals reviewed        Social History     Socioeconomic History    Marital status: /Civil Union     Spouse name: Not on file    Number of children: Not on file    Years of education: Not on file    Highest education level: Not on file   Occupational History    Not on file   Social Needs    Financial resource strain: Not on file    Food insecurity:     Worry: Not on file     Inability: Not on file    Transportation needs:     Medical: Not on file     Non-medical: Not on file   Tobacco Use    Smoking status: Never Smoker    Smokeless tobacco: Never Used    Tobacco comment: former smoker noted in "allscripts"   Substance and Sexual Activity    Alcohol use: Yes     Comment: Rarely     Drug use: Not on file    Sexual activity: Not on file   Lifestyle    Physical activity:     Days per week: Not on file     Minutes per session: Not on file    Stress: Not on file   Relationships    Social connections:     Talks on phone: Not on file     Gets together: Not on file     Attends [employer] service: Not on file     Active member of club or organization: Not on file     Attends meetings of clubs or organizations: Not on file     Relationship status: Not on file    Intimate partner violence:     Fear of current or ex partner: Not on file     Emotionally abused: Not on file     Physically abused: Not on file     Forced sexual activity: Not on file   Other Topics Concern    Not on file   Social History Narrative    Not on file     Past Medical History:   Diagnosis Date    Depression with anxiety     last assessed: April 11, 2017    Hypothyroidism     last assessed: Aug 29, 2017    Persistent insomnia     last assessed: Dec 21, 2016       Current Outpatient Medications:     thyroid (ARMOUR THYROID) 120 MG tablet, Take by mouth, Disp: , Rfl:     cyclobenzaprine (FLEXERIL) 10 mg tablet, Take 1 tablet (10 mg total) by mouth daily at bedtime as needed for muscle spasms (Patient not taking: Reported on 3/6/2019), Disp: 30 tablet, Rfl: 0    eszopiclone (LUNESTA) 3 MG tablet, Take 1 tablet (3 mg total) by mouth daily at bedtime as needed for sleep Take immediately before bedtime, Disp: 30 tablet, Rfl: 3    hydrochlorothiazide (HYDRODIURIL) 12 5 mg tablet, TAKE 1 TABLET AS NEEDED FOR SWELLING OF HANDS AS NEEDED, Disp: , Rfl: 0    OLANZapine (ZyPREXA ZYDIS) 5 mg dispersible tablet, Take 1 tablet (5 mg total) by mouth daily at bedtime for 90 days, Disp: 90 tablet, Rfl: 0    thyroid (ARMOUR THYROID) 90 MG tablet, Take by mouth, Disp: , Rfl:     topiramate (TOPAMAX) 50 MG tablet, Take 1 pill twice a day, Disp: 180 tablet, Rfl: 1  Family History   Adopted: Yes       Patient Instructions   TAKE HALF A PILL AT NIGHT FOR 1 WEEK  After that take half a pill morning and night for 1 week  After that you take a whole pill morning and night  You can continue to take her Serena Ranks just as you are doing  I am reorder filling your Zyprexa that you can use during the day for occasions of anxiety  You can also use the Zyprexa at night for teeth grinding that works very well  Get her fasting blood work done  Call me and let me know if you are due for your DEXA scan  Get her mammo done              ANNALEE Birmingham

## 2019-04-03 ENCOUNTER — TELEPHONE (OUTPATIENT)
Dept: FAMILY MEDICINE CLINIC | Facility: CLINIC | Age: 49
End: 2019-04-03

## 2019-04-04 DIAGNOSIS — M81.0 AGE-RELATED OSTEOPOROSIS WITHOUT CURRENT PATHOLOGICAL FRACTURE: Primary | ICD-10-CM

## 2019-04-19 ENCOUNTER — OFFICE VISIT (OUTPATIENT)
Dept: FAMILY MEDICINE CLINIC | Facility: CLINIC | Age: 49
End: 2019-04-19
Payer: COMMERCIAL

## 2019-04-19 VITALS
HEIGHT: 60 IN | HEART RATE: 90 BPM | DIASTOLIC BLOOD PRESSURE: 82 MMHG | TEMPERATURE: 99 F | BODY MASS INDEX: 32.39 KG/M2 | WEIGHT: 165 LBS | OXYGEN SATURATION: 98 % | SYSTOLIC BLOOD PRESSURE: 118 MMHG

## 2019-04-19 DIAGNOSIS — G89.29 CHRONIC BILATERAL LOW BACK PAIN WITH BILATERAL SCIATICA: Primary | ICD-10-CM

## 2019-04-19 DIAGNOSIS — M54.42 CHRONIC BILATERAL LOW BACK PAIN WITH BILATERAL SCIATICA: Primary | ICD-10-CM

## 2019-04-19 DIAGNOSIS — M54.41 CHRONIC BILATERAL LOW BACK PAIN WITH BILATERAL SCIATICA: Primary | ICD-10-CM

## 2019-04-19 PROCEDURE — 3008F BODY MASS INDEX DOCD: CPT | Performed by: NURSE PRACTITIONER

## 2019-04-19 PROCEDURE — 99213 OFFICE O/P EST LOW 20 MIN: CPT | Performed by: NURSE PRACTITIONER

## 2019-04-19 RX ORDER — PREDNISONE 20 MG/1
20 TABLET ORAL DAILY
Qty: 5 TABLET | Refills: 0 | Status: SHIPPED | OUTPATIENT
Start: 2019-04-19 | End: 2019-07-31

## 2019-04-19 RX ORDER — METHOCARBAMOL 500 MG/1
500 TABLET, FILM COATED ORAL 4 TIMES DAILY
Qty: 30 TABLET | Refills: 0 | Status: SHIPPED | OUTPATIENT
Start: 2019-04-19 | End: 2019-07-31

## 2019-05-08 ENCOUNTER — OFFICE VISIT (OUTPATIENT)
Dept: FAMILY MEDICINE CLINIC | Facility: CLINIC | Age: 49
End: 2019-05-08
Payer: COMMERCIAL

## 2019-05-08 VITALS
HEART RATE: 82 BPM | WEIGHT: 165.6 LBS | HEIGHT: 60 IN | SYSTOLIC BLOOD PRESSURE: 122 MMHG | DIASTOLIC BLOOD PRESSURE: 84 MMHG | BODY MASS INDEX: 32.51 KG/M2 | OXYGEN SATURATION: 97 %

## 2019-05-08 DIAGNOSIS — M54.42 CHRONIC LEFT-SIDED LOW BACK PAIN WITH LEFT-SIDED SCIATICA: Primary | ICD-10-CM

## 2019-05-08 DIAGNOSIS — G89.29 CHRONIC LEFT-SIDED LOW BACK PAIN WITH LEFT-SIDED SCIATICA: Primary | ICD-10-CM

## 2019-05-08 DIAGNOSIS — Q05.7 SPINA BIFIDA OF LUMBOSACRAL REGION WITHOUT HYDROCEPHALUS (HCC): ICD-10-CM

## 2019-05-08 PROCEDURE — 1036F TOBACCO NON-USER: CPT | Performed by: FAMILY MEDICINE

## 2019-05-08 PROCEDURE — 99213 OFFICE O/P EST LOW 20 MIN: CPT | Performed by: FAMILY MEDICINE

## 2019-05-08 RX ORDER — LORAZEPAM 1 MG/1
TABLET ORAL
COMMUNITY
Start: 2018-08-06 | End: 2019-07-31

## 2019-05-08 RX ORDER — TRAMADOL HYDROCHLORIDE 50 MG/1
50 TABLET ORAL EVERY 6 HOURS PRN
Qty: 45 TABLET | Refills: 0 | Status: SHIPPED | OUTPATIENT
Start: 2019-05-08 | End: 2019-07-31

## 2019-05-08 RX ORDER — TRAMADOL HYDROCHLORIDE 50 MG/1
50 TABLET ORAL EVERY 6 HOURS PRN
Qty: 45 TABLET | Refills: 0 | Status: SHIPPED | OUTPATIENT
Start: 2019-05-08 | End: 2019-05-08 | Stop reason: SDUPTHER

## 2019-05-08 RX ORDER — CELECOXIB 200 MG/1
200 CAPSULE ORAL DAILY
Refills: 0 | COMMUNITY
Start: 2019-04-23 | End: 2019-07-31

## 2019-05-09 ENCOUNTER — TELEPHONE (OUTPATIENT)
Dept: PHYSICAL THERAPY | Facility: OTHER | Age: 49
End: 2019-05-09

## 2019-05-10 ENCOUNTER — NURSE TRIAGE (OUTPATIENT)
Dept: PHYSICAL THERAPY | Facility: OTHER | Age: 49
End: 2019-05-10

## 2019-05-10 DIAGNOSIS — M54.42 ACUTE LEFT-SIDED LOW BACK PAIN WITH LEFT-SIDED SCIATICA: Primary | ICD-10-CM

## 2019-05-23 ENCOUNTER — EVALUATION (OUTPATIENT)
Dept: PHYSICAL THERAPY | Facility: CLINIC | Age: 49
End: 2019-05-23
Payer: COMMERCIAL

## 2019-05-23 VITALS — DIASTOLIC BLOOD PRESSURE: 99 MMHG | TEMPERATURE: 97.4 F | SYSTOLIC BLOOD PRESSURE: 134 MMHG

## 2019-05-23 DIAGNOSIS — M54.42 ACUTE LEFT-SIDED LOW BACK PAIN WITH LEFT-SIDED SCIATICA: Primary | ICD-10-CM

## 2019-05-23 PROCEDURE — G8990 OTHER PT/OT CURRENT STATUS: HCPCS | Performed by: PHYSICAL THERAPIST

## 2019-05-23 PROCEDURE — 97110 THERAPEUTIC EXERCISES: CPT | Performed by: PHYSICAL THERAPIST

## 2019-05-23 PROCEDURE — G8991 OTHER PT/OT GOAL STATUS: HCPCS | Performed by: PHYSICAL THERAPIST

## 2019-05-23 PROCEDURE — 97161 PT EVAL LOW COMPLEX 20 MIN: CPT | Performed by: PHYSICAL THERAPIST

## 2019-05-29 ENCOUNTER — TRANSCRIBE ORDERS (OUTPATIENT)
Dept: PHYSICAL THERAPY | Facility: CLINIC | Age: 49
End: 2019-05-29

## 2019-05-29 DIAGNOSIS — M54.42 ACUTE LEFT-SIDED LOW BACK PAIN WITH LEFT-SIDED SCIATICA: Primary | ICD-10-CM

## 2019-06-03 ENCOUNTER — OFFICE VISIT (OUTPATIENT)
Dept: PHYSICAL THERAPY | Facility: CLINIC | Age: 49
End: 2019-06-03
Payer: COMMERCIAL

## 2019-06-03 DIAGNOSIS — M54.42 ACUTE LEFT-SIDED LOW BACK PAIN WITH LEFT-SIDED SCIATICA: Primary | ICD-10-CM

## 2019-06-03 PROCEDURE — 97140 MANUAL THERAPY 1/> REGIONS: CPT | Performed by: PHYSICAL THERAPIST

## 2019-06-03 PROCEDURE — 97110 THERAPEUTIC EXERCISES: CPT | Performed by: PHYSICAL THERAPIST

## 2019-06-03 PROCEDURE — 97112 NEUROMUSCULAR REEDUCATION: CPT | Performed by: PHYSICAL THERAPIST

## 2019-06-04 ENCOUNTER — APPOINTMENT (OUTPATIENT)
Dept: PHYSICAL THERAPY | Facility: CLINIC | Age: 49
End: 2019-06-04
Payer: COMMERCIAL

## 2019-06-10 ENCOUNTER — APPOINTMENT (OUTPATIENT)
Dept: PHYSICAL THERAPY | Facility: CLINIC | Age: 49
End: 2019-06-10
Payer: COMMERCIAL

## 2019-06-11 ENCOUNTER — APPOINTMENT (OUTPATIENT)
Dept: PHYSICAL THERAPY | Facility: CLINIC | Age: 49
End: 2019-06-11
Payer: COMMERCIAL

## 2019-06-17 ENCOUNTER — OFFICE VISIT (OUTPATIENT)
Dept: PHYSICAL THERAPY | Facility: CLINIC | Age: 49
End: 2019-06-17
Payer: COMMERCIAL

## 2019-06-17 DIAGNOSIS — M54.42 ACUTE LEFT-SIDED LOW BACK PAIN WITH LEFT-SIDED SCIATICA: Primary | ICD-10-CM

## 2019-06-17 PROCEDURE — 97140 MANUAL THERAPY 1/> REGIONS: CPT | Performed by: PHYSICAL THERAPIST

## 2019-06-17 PROCEDURE — 97112 NEUROMUSCULAR REEDUCATION: CPT | Performed by: PHYSICAL THERAPIST

## 2019-06-20 ENCOUNTER — OFFICE VISIT (OUTPATIENT)
Dept: PHYSICAL THERAPY | Facility: CLINIC | Age: 49
End: 2019-06-20
Payer: COMMERCIAL

## 2019-06-20 DIAGNOSIS — M54.42 ACUTE LEFT-SIDED LOW BACK PAIN WITH LEFT-SIDED SCIATICA: Primary | ICD-10-CM

## 2019-06-20 PROCEDURE — 97112 NEUROMUSCULAR REEDUCATION: CPT | Performed by: PHYSICAL THERAPIST

## 2019-06-20 PROCEDURE — 97140 MANUAL THERAPY 1/> REGIONS: CPT | Performed by: PHYSICAL THERAPIST

## 2019-07-17 DIAGNOSIS — G47.00 INSOMNIA, UNSPECIFIED TYPE: ICD-10-CM

## 2019-07-20 RX ORDER — ESZOPICLONE 3 MG/1
3 TABLET, FILM COATED ORAL
Qty: 30 TABLET | Refills: 3 | Status: SHIPPED | OUTPATIENT
Start: 2019-07-20 | End: 2019-11-06 | Stop reason: SDUPTHER

## 2019-07-31 ENCOUNTER — OFFICE VISIT (OUTPATIENT)
Dept: FAMILY MEDICINE CLINIC | Facility: CLINIC | Age: 49
End: 2019-07-31
Payer: COMMERCIAL

## 2019-07-31 VITALS
BODY MASS INDEX: 33.38 KG/M2 | OXYGEN SATURATION: 98 % | HEIGHT: 60 IN | HEART RATE: 72 BPM | DIASTOLIC BLOOD PRESSURE: 78 MMHG | WEIGHT: 170 LBS | SYSTOLIC BLOOD PRESSURE: 116 MMHG

## 2019-07-31 DIAGNOSIS — F41.9 ANXIETY: ICD-10-CM

## 2019-07-31 DIAGNOSIS — Q05.7 SPINA BIFIDA OF LUMBOSACRAL REGION WITHOUT HYDROCEPHALUS (HCC): ICD-10-CM

## 2019-07-31 DIAGNOSIS — E55.9 HYPOVITAMINOSIS D: ICD-10-CM

## 2019-07-31 DIAGNOSIS — M54.41 CHRONIC BILATERAL LOW BACK PAIN WITH BILATERAL SCIATICA: ICD-10-CM

## 2019-07-31 DIAGNOSIS — K21.9 GASTROESOPHAGEAL REFLUX DISEASE WITHOUT ESOPHAGITIS: Primary | ICD-10-CM

## 2019-07-31 DIAGNOSIS — G89.29 CHRONIC BILATERAL LOW BACK PAIN WITH BILATERAL SCIATICA: ICD-10-CM

## 2019-07-31 DIAGNOSIS — M54.42 CHRONIC BILATERAL LOW BACK PAIN WITH BILATERAL SCIATICA: ICD-10-CM

## 2019-07-31 PROCEDURE — 99214 OFFICE O/P EST MOD 30 MIN: CPT | Performed by: FAMILY MEDICINE

## 2019-07-31 PROCEDURE — 1036F TOBACCO NON-USER: CPT | Performed by: FAMILY MEDICINE

## 2019-07-31 PROCEDURE — 3008F BODY MASS INDEX DOCD: CPT | Performed by: FAMILY MEDICINE

## 2019-07-31 RX ORDER — ERGOCALCIFEROL 1.25 MG/1
CAPSULE ORAL
Qty: 8 CAPSULE | Refills: 0 | Status: SHIPPED | OUTPATIENT
Start: 2019-07-31

## 2019-07-31 RX ORDER — OLANZAPINE 5 MG/1
5 TABLET, ORALLY DISINTEGRATING ORAL
Qty: 90 TABLET | Refills: 1 | Status: SHIPPED | OUTPATIENT
Start: 2019-07-31 | End: 2019-07-31 | Stop reason: SDUPTHER

## 2019-07-31 RX ORDER — LORAZEPAM 1 MG/1
TABLET ORAL
Qty: 60 TABLET | Refills: 1 | Status: SHIPPED | OUTPATIENT
Start: 2019-07-31 | End: 2019-11-06 | Stop reason: SDUPTHER

## 2019-07-31 RX ORDER — OLANZAPINE 5 MG/1
TABLET, ORALLY DISINTEGRATING ORAL
Qty: 90 TABLET | Refills: 1 | Status: SHIPPED | OUTPATIENT
Start: 2019-07-31 | End: 2019-07-31 | Stop reason: SDUPTHER

## 2019-07-31 RX ORDER — PANTOPRAZOLE SODIUM 40 MG/1
40 TABLET, DELAYED RELEASE ORAL
Qty: 90 TABLET | Refills: 1 | Status: SHIPPED | OUTPATIENT
Start: 2019-07-31

## 2019-07-31 RX ORDER — OLANZAPINE 5 MG/1
TABLET, ORALLY DISINTEGRATING ORAL
Qty: 90 TABLET | Refills: 1 | Status: SHIPPED | OUTPATIENT
Start: 2019-07-31

## 2019-07-31 NOTE — PATIENT INSTRUCTIONS
For the phentermine  Find the best time for you to take it  Remember to limit the caffeine wire taking this could the can make you little shaky  Do not confuse the loss of appetite with anxiety 2 different things  Try to eat well on it because phentermine does work you will lose weight  You might have a little trouble sleeping when you for started it but then take it a little bit earlier in the day while your on  GOOD LUCK WITH EPIC  Low Fat Diet   AMBULATORY CARE:   A low-fat diet  is an eating plan that is low in total fat, unhealthy fat, and cholesterol  You may need to follow a low-fat diet if you have trouble digesting or absorbing fat  You may also need to follow this diet if you have high cholesterol  You can also lower your cholesterol by increasing the amount of fiber in your diet  Soluble fiber is a type of fiber that helps to decrease cholesterol levels  Different types of fat in food:   · Limit unhealthy fats  A diet that is high in cholesterol, saturated fat, and trans fat may cause unhealthy cholesterol levels  Unhealthy cholesterol levels increase your risk of heart disease  ¨ Cholesterol:  Limit intake of cholesterol to less than 200 mg per day  Cholesterol is found in meat, eggs, and dairy  ¨ Saturated fat:  Limit saturated fat to less than 7% of your total daily calories  Ask your dietitian how many calories you need each day  Saturated fat is found in butter, cheese, ice cream, whole milk, and palm oil  Saturated fat is also found in meat, such as beef, pork, chicken skin, and processed meats  Processed meats include sausage, hot dogs, and bologna  ¨ Trans fat:  Avoid trans fat as much as possible  Trans fat is used in fried and baked foods  Foods that say trans fat free on the label may still have up to 0 5 grams of trans fat per serving  · Include healthy fats  Replace foods that are high in saturated and trans fat with foods high in healthy fats   This may help to decrease high cholesterol levels  ¨ Monounsaturated fats: These are found in avocados, nuts, and vegetable oils, such as olive, canola, and sunflower oil  ¨ Polyunsaturated fats: These can be found in vegetable oils, such as soybean or corn oil  Omega-3 fats can help to decrease the risk of heart disease  Omega-3 fats are found in fish, such as salmon, herring, trout, and tuna  Omega-3 fats can also be found in plant foods, such as walnuts, flaxseed, soybeans, and canola oil    Foods to limit or avoid:   · Grains:      ¨ Snacks that are made with partially hydrogenated oils, such as chips, regular crackers, and butter-flavored popcorn    ¨ High-fat baked goods, such as biscuits, croissants, doughnuts, pies, cookies, and pastries    · Dairy:      ¨ Whole milk, 2% milk, and yogurt and ice cream made with whole milk    ¨ Half and half creamer, heavy cream, and whipping cream    ¨ Cheese, cream cheese, and sour cream    · Meats and proteins:      ¨ High-fat cuts of meat (T-bone steak, regular hamburger, and ribs)    ¨ Fried meat, poultry (turkey and chicken), and fish    ¨ Poultry (chicken and turkey) with skin    ¨ Cold cuts (salami or bologna), hot dogs, delcid, and sausage    ¨ Whole eggs and egg yolks    · Vegetables and fruits with added fat:      ¨ Fried vegetables or vegetables in butter or high-fat sauces, such as cream or cheese sauces    ¨ Fried fruit or fruit served with butter or cream    · Fats:      ¨ Butter, stick margarine, and shortening    ¨ Coconut, palm oil, and palm kernel oil  Foods to include:   · Grains:      ¨ Whole-grain breads, cereals, pasta, and brown rice    ¨ Low-fat crackers and pretzels    · Vegetables and fruits:      ¨ Fresh, frozen, or canned vegetables (no salt or low-sodium)    ¨ Fresh, frozen, dried, or canned fruit (canned in light syrup or fruit juice)    ¨ Avocado    · Low-fat dairy products:      ¨ Nonfat (skim) or 1% milk    ¨ Nonfat or low-fat cheese, yogurt, and cottage cheese    · Meats and proteins:      ¨ Chicken or turkey with no skin    ¨ Baked or broiled fish    ¨ Lean beef and pork (loin, round, extra lean hamburger)    ¨ Beans and peas, unsalted nuts, soy products    ¨ Egg whites and substitutes    ¨ Seeds and nuts    · Fats:      ¨ Unsaturated oil, such as canola, olive, peanut, soybean, or sunflower oil    ¨ Soft or liquid margarine and vegetable oil spread    ¨ Low-fat salad dressing  Other ways to decrease fat:   · Read food labels before you buy foods  Choose foods that have less than 30% of calories from fat  Choose low-fat or fat-free dairy products  Remember that fat free does not mean calorie free  These foods still contain calories, and too many calories can lead to weight gain  · Trim fat from meat and avoid fried food  Trim all visible fat from meat before you cook it  Remove the skin from poultry  Do not gibbs meat, fish, or poultry  Bake, roast, boil, or broil these foods instead  Avoid fried foods  Eat a baked potato instead of [place] fries  Steam vegetables instead of sautéing them in butter  · Add less fat to foods  Use imitation delcid bits on salads and baked potatoes instead of regular delcid bits  Use fat-free or low-fat salad dressings instead of regular dressings  Use low-fat or nonfat butter-flavored topping instead of regular butter or margarine on popcorn and other foods  Ways to decrease fat in recipes:  Replace high-fat ingredients with low-fat or nonfat ones  This may cause baked goods to be drier than usual  You may need to use nonfat cooking spray on pans to prevent food from sticking  You also may need to change the amount of other ingredients, such as water, in the recipe  Try the following:  · Use low-fat or light margarine instead of regular margarine or shortening  · Use lean ground turkey breast or chicken, or lean ground beef (less than 5% fat) instead of hamburger       · Add 1 teaspoon of canola oil to 8 ounces of skim milk instead of using cream or half and half  · Use grated zucchini, carrots, or apples in breads instead of coconut  · Use blenderized, low-fat cottage cheese, plain tofu, or low-fat ricotta cheese instead of cream cheese  · Use 1 egg white and 1 teaspoon of canola oil, or use ¼ cup (2 ounces) of fat-free egg substitute instead of a whole egg  · Replace half of the oil that is called for in a recipe with applesauce when you bake  Use 3 tablespoons of cocoa powder and 1 tablespoon of canola oil instead of a square of baking chocolate  How to increase fiber:  Eat enough high-fiber foods to get 20 to 30 grams of fiber every day  Slowly increase your fiber intake to avoid stomach cramps, gas, and other problems  · Eat 3 ounces of whole-grain foods each day  An ounce is about 1 slice of bread  Eat whole-grain breads, such as whole-wheat bread  Whole wheat, whole-wheat flour, or other whole grains should be listed as the first ingredient on the food label  Replace white flour with whole-grain flour or use half of each in recipes  Whole-grain flour is heavier than white flour, so you may have to add more yeast or baking powder  · Eat a high-fiber cereal for breakfast   Oatmeal is a good source of soluble fiber  Look for cereals that have bran or fiber in the name  Choose whole-grain products, such as brown rice, barley, and whole-wheat pasta  · Eat more beans, peas, and lentils  For example, add beans to soups or salads  Eat at least 5 cups of fruits and vegetables each day  Eat fruits and vegetables with the peel because the peel is high in fiber  © 2017 [employer]  Information is for End User's use only and may not be sold, redistributed or otherwise used for commercial purposes  All illustrations and images included in CareNotes® are the copyrighted property of [employer]  or [employer]  The above information is an  only   It is not intended as medical advice for individual conditions or treatments  Talk to your doctor, nurse or pharmacist before following any medical regimen to see if it is safe and effective for you  Heart Healthy Diet   AMBULATORY CARE:   A heart healthy diet  is an eating plan low in total fat, unhealthy fats, and sodium (salt)  A heart healthy diet helps decrease your risk for heart disease and stroke  Limit the amount of fat you eat to 25% to 35% of your total daily calories  Limit sodium to less than 2,300 mg each day  Healthy fats:  Healthy fats can help improve cholesterol levels  The risk for heart disease is decreased when cholesterol levels are normal  Choose healthy fats, such as the following:  · Unsaturated fat  is found in foods such as soybean, canola, olive, corn, and safflower oils  It is also found in soft tub margarine that is made with liquid vegetable oil  · Omega-3 fat  is found in certain fish, such as salmon, tuna, and trout, and in walnuts and flaxseed  Unhealthy fats:  Unhealthy fats can cause unhealthy cholesterol levels in your blood and increase your risk of heart disease  Limit unhealthy fats, such as the following:  · Cholesterol  is found in animal foods, such as eggs and lobster, and in dairy products made from whole milk  Limit cholesterol to less than 300 milligrams (mg) each day  You may need to limit cholesterol to 200 mg each day if you have heart disease  · Saturated fat  is found in meats, such as delcid and hamburger  It is also found in chicken or turkey skin, whole milk, and butter  Limit saturated fat to less than 7% of your total daily calories  Limit saturated fat to less than 6% if you have heart disease or are at increased risk for it  · Trans fat  is found in packaged foods, such as potato chips and cookies  It is also in hard margarine, some fried foods, and shortening  Avoid trans fats as much as possible    Heart healthy foods and drinks to include:  Ask your dietitian or healthcare provider how many servings to have from each of the following food groups:  · Grains:      ¨ Whole-wheat breads, cereals, and pastas, and brown rice    ¨ Low-fat, low-sodium crackers and chips    · Vegetables:      ¨ Broccoli, green beans, green peas, and spinach    ¨ Collards, kale, and lima beans    ¨ Carrots, sweet potatoes, tomatoes, and peppers    ¨ Canned vegetables with no salt added    · Fruits:      ¨ Bananas, peaches, pears, and pineapple    ¨ Grapes, raisins, and dates    ¨ Oranges, tangerines, grapefruit, orange juice, and grapefruit juice    ¨ Apricots, mangoes, melons, and papaya    ¨ Raspberries and strawberries    ¨ Canned fruit with no added sugar    · Low-fat dairy products:      ¨ Nonfat (skim) milk, 1% milk, and low-fat almond, cashew, or soy milks fortified with calcium    ¨ Low-fat cheese, regular or frozen yogurt, and cottage cheese    · Meats and proteins , such as lean cuts of beef and pork (loin, leg, round), skinless chicken and turkey, legumes, soy products, egg whites, and nuts  Foods and drinks to limit or avoid:  Ask your dietitian or healthcare provider about these and other foods that are high in unhealthy fat, sodium, and sugar:  · Snack or packaged foods , such as frozen dinners, cookies, macaroni and cheese, and cereals with more than 300 mg of sodium per serving    · Canned or dry mixes  for cakes, soups, sauces, or gravies    · Vegetables with added sodium , such as instant potatoes, vegetables with added sauces, or regular canned vegetables    · Other foods high in sodium , such as ketchup, barbecue sauce, salad dressing, pickles, olives, soy sauce, and miso    · High-fat dairy foods  such as whole or 2% milk, cream cheese, or sour cream, and cheeses     · High-fat protein foods  such as high-fat cuts of beef (T-bone steaks, ribs), chicken or turkey with skin, and organ meats, such as liver    · Cured or smoked meats , such as hot dogs, delcid, and sausage    · Unhealthy fats and oils , such as butter, stick margarine, shortening, and cooking oils such as coconut or palm oil    · Food and drinks high in sugar , such as soft drinks (soda), sports drinks, sweetened tea, candy, cake, cookies, pies, and doughnuts  Other diet guidelines to follow:   · Eat more foods containing omega-3 fats  Eat fish high in omega-3 fats at least 2 times a week  · Limit alcohol  Too much alcohol can damage your heart and raise your blood pressure  Women should limit alcohol to 1 drink a day  Men should limit alcohol to 2 drinks a day  A drink of alcohol is 12 ounces of beer, 5 ounces of wine, or 1½ ounces of liquor  · Choose low-sodium foods  High-sodium foods can lead to high blood pressure  Add little or no salt to food you prepare  Use herbs and spices in place of salt  · Eat more fiber  to help lower cholesterol levels  Eat at least 5 servings of fruits and vegetables each day  Eat 3 ounces of whole-grain foods each day  Legumes (beans) are also a good source of fiber  Lifestyle guidelines:   · Do not smoke  Nicotine and other chemicals in cigarettes and cigars can cause lung and heart damage  Ask your healthcare provider for information if you currently smoke and need help to quit  E-cigarettes or smokeless tobacco still contain nicotine  Talk to your healthcare provider before you use these products  · Exercise regularly  to help you maintain a healthy weight and improve your blood pressure and cholesterol levels  Ask your healthcare provider about the best exercise plan for you  Do not start an exercise program without asking your healthcare provider  Follow up with your healthcare provider as directed:  Write down your questions so you remember to ask them during your visits  © 2017 [employer] Information is for End User's use only and may not be sold, redistributed or otherwise used for commercial purposes   All illustrations and images included in CareNotes® are the copyrighted property of [employer]  or [employer]  The above information is an  only  It is not intended as medical advice for individual conditions or treatments  Talk to your doctor, nurse or pharmacist before following any medical regimen to see if it is safe and effective for you

## 2019-07-31 NOTE — PROGRESS NOTES
Standard Visit   Assessment/Plan:     Visit Diagnosis:  Diagnoses and all orders for this visit:    Gastroesophageal reflux disease without esophagitis  -     pantoprazole (PROTONIX) 40 mg tablet; Take 1 tablet (40 mg total) by mouth daily before breakfast    Anxiety  -     Discontinue: OLANZapine (ZyPREXA ZYDIS) 5 mg dispersible tablet; Take 1 tablet (5 mg total) by mouth daily at bedtime for 90 days  -     Discontinue: OLANZapine (ZyPREXA ZYDIS) 5 mg dispersible tablet; Take 1 pill daily at bedtime  -     OLANZapine (ZyPREXA ZYDIS) 5 mg dispersible tablet; Take 1 pill daily at bedtime  -     LORazepam (ATIVAN) 1 mg tablet; May take 1 pill twice a day as needed for anxiety    Spina bifida of lumbosacral region without hydrocephalus (HCC)    Chronic bilateral low back pain with bilateral sciatica    Hypovitaminosis D  -     ergocalciferol (VITAMIN D2) 50,000 units; TAKE 1 PILL TWICE A WEEK X1 MONTH      For the phentermine  Find the best time for you to take it  Remember to limit the caffeine wire taking this could the can make you little shaky  Do not confuse the loss of appetite with anxiety 2 different things  Try to eat well on it because phentermine does work you will lose weight  You might have a little trouble sleeping when you for started it but then take it a little bit earlier in the day while your on  GOOD LUCK WITH EPIC    Subjective:    Patient ID: Jazmyn Rodriguez is a 57 y o  female       Patient is here with the following concerns:    Here for checkup  The lots going on here  She is selling her house  She is moving to [place]  There is so much going on  She does is not looking for to the coming winter she hates the snow  She is under a tremendous amount of stress  She is hypothyroid and is treated with Synthroid  She is on Zyprexa for mood  She has insomnia which is chronic that we treat with Lunesta  She also have labs done and we will review them today  She works at [employer]      The following portions of the patient's history were reviewed and updated as appropriate: allergies, current medications, past family history, past medical history, past social history, past surgical history and problem list     Review of Systems      /78   Pulse 72   Ht 5' (1 524 m)   Wt 77 1 kg (170 lb)   SpO2 98%   BMI 33 20 kg/m²   Social History     Socioeconomic History    Marital status: /Civil Union     Spouse name: Not on file    Number of children: Not on file    Years of education: Not on file    Highest education level: Not on file   Occupational History    Not on file   Social Needs    Financial resource strain: Not on file    Food insecurity:     Worry: Not on file     Inability: Not on file    Transportation needs:     Medical: Not on file     Non-medical: Not on file   Tobacco Use    Smoking status: Never Smoker    Smokeless tobacco: Never Used    Tobacco comment: former smoker noted in "allscripts"   Substance and Sexual Activity    Alcohol use: Yes     Comment: Rarely     Drug use: Not on file    Sexual activity: Not on file   Lifestyle    Physical activity:     Days per week: Not on file     Minutes per session: Not on file    Stress: Not on file   Relationships    Social connections:     Talks on phone: Not on file     Gets together: Not on file     Attends Cheondoism service: Not on file     Active member of club or organization: Not on file     Attends meetings of clubs or organizations: Not on file     Relationship status: Not on file    Intimate partner violence:     Fear of current or ex partner: Not on file     Emotionally abused: Not on file     Physically abused: Not on file     Forced sexual activity: Not on file   Other Topics Concern    Not on file   Social History Narrative    Not on file     Past Medical History:   Diagnosis Date    Depression with anxiety     last assessed: April 11, 2017    Hypothyroidism     last assessed: Aug 29, 2017    Persistent insomnia     last assessed: Dec 21, 2016     Family History   Adopted: Yes     Past Surgical History:   Procedure Laterality Date    MYRINGOTOMY W/ TUBES Left     SINUS SURGERY         Current Outpatient Medications:     ergocalciferol (VITAMIN D2) 50,000 units, TAKE 1 PILL TWICE A WEEK X1 MONTH, Disp: 8 capsule, Rfl: 0    eszopiclone (LUNESTA) 3 MG tablet, Take 1 tablet (3 mg total) by mouth daily at bedtime as needed for sleep Take immediately before bedtime, Disp: 30 tablet, Rfl: 3    LORazepam (ATIVAN) 1 mg tablet, May take 1 pill twice a day as needed for anxiety, Disp: 60 tablet, Rfl: 1    OLANZapine (ZyPREXA ZYDIS) 5 mg dispersible tablet, Take 1 pill daily at bedtime, Disp: 90 tablet, Rfl: 1    pantoprazole (PROTONIX) 40 mg tablet, Take 1 tablet (40 mg total) by mouth daily before breakfast, Disp: 90 tablet, Rfl: 1    thyroid (ARMOUR THYROID) 120 MG tablet, Take by mouth, Disp: , Rfl:       Objective:     Physical Exam      Patient Instructions   For the phentermine  Find the best time for you to take it  Remember to limit the caffeine wire taking this could the can make you little shaky  Do not confuse the loss of appetite with anxiety 2 different things  Try to eat well on it because phentermine does work you will lose weight  You might have a little trouble sleeping when you for started it but then take it a little bit earlier in the day while your on  GOOD LUCK WITH EPIC  Low Fat Diet   AMBULATORY CARE:   A low-fat diet  is an eating plan that is low in total fat, unhealthy fat, and cholesterol  You may need to follow a low-fat diet if you have trouble digesting or absorbing fat  You may also need to follow this diet if you have high cholesterol  You can also lower your cholesterol by increasing the amount of fiber in your diet  Soluble fiber is a type of fiber that helps to decrease cholesterol levels  Different types of fat in food:   · Limit unhealthy fats    A diet that is high in cholesterol, saturated fat, and trans fat may cause unhealthy cholesterol levels  Unhealthy cholesterol levels increase your risk of heart disease  ¨ Cholesterol:  Limit intake of cholesterol to less than 200 mg per day  Cholesterol is found in meat, eggs, and dairy  ¨ Saturated fat:  Limit saturated fat to less than 7% of your total daily calories  Ask your dietitian how many calories you need each day  Saturated fat is found in butter, cheese, ice cream, whole milk, and palm oil  Saturated fat is also found in meat, such as beef, pork, chicken skin, and processed meats  Processed meats include sausage, hot dogs, and bologna  ¨ Trans fat:  Avoid trans fat as much as possible  Trans fat is used in fried and baked foods  Foods that say trans fat free on the label may still have up to 0 5 grams of trans fat per serving  · Include healthy fats  Replace foods that are high in saturated and trans fat with foods high in healthy fats  This may help to decrease high cholesterol levels  ¨ Monounsaturated fats: These are found in avocados, nuts, and vegetable oils, such as olive, canola, and sunflower oil  ¨ Polyunsaturated fats: These can be found in vegetable oils, such as soybean or corn oil  Omega-3 fats can help to decrease the risk of heart disease  Omega-3 fats are found in fish, such as salmon, herring, trout, and tuna  Omega-3 fats can also be found in plant foods, such as walnuts, flaxseed, soybeans, and canola oil    Foods to limit or avoid:   · Grains:      ¨ Snacks that are made with partially hydrogenated oils, such as chips, regular crackers, and butter-flavored popcorn    ¨ High-fat baked goods, such as biscuits, croissants, doughnuts, pies, cookies, and pastries    · Dairy:      ¨ Whole milk, 2% milk, and yogurt and ice cream made with whole milk    ¨ Half and half creamer, heavy cream, and whipping cream    ¨ Cheese, cream cheese, and sour cream    · Meats and proteins: ¨ High-fat cuts of meat (T-bone steak, regular hamburger, and ribs)    ¨ Fried meat, poultry (turkey and chicken), and fish    ¨ Poultry (chicken and turkey) with skin    ¨ Cold cuts (salami or bologna), hot dogs, delcid, and sausage    ¨ Whole eggs and egg yolks    · Vegetables and fruits with added fat:      ¨ Fried vegetables or vegetables in butter or high-fat sauces, such as cream or cheese sauces    ¨ Fried fruit or fruit served with butter or cream    · Fats:      ¨ Butter, stick margarine, and shortening    ¨ Coconut, palm oil, and palm kernel oil  Foods to include:   · Grains:      ¨ Whole-grain breads, cereals, pasta, and brown rice    ¨ Low-fat crackers and pretzels    · Vegetables and fruits:      ¨ Fresh, frozen, or canned vegetables (no salt or low-sodium)    ¨ Fresh, frozen, dried, or canned fruit (canned in light syrup or fruit juice)    ¨ Avocado    · Low-fat dairy products:      ¨ Nonfat (skim) or 1% milk    ¨ Nonfat or low-fat cheese, yogurt, and cottage cheese    · Meats and proteins:      ¨ Chicken or turkey with no skin    ¨ Baked or broiled fish    ¨ Lean beef and pork (loin, round, extra lean hamburger)    ¨ Beans and peas, unsalted nuts, soy products    ¨ Egg whites and substitutes    ¨ Seeds and nuts    · Fats:      ¨ Unsaturated oil, such as canola, olive, peanut, soybean, or sunflower oil    ¨ Soft or liquid margarine and vegetable oil spread    ¨ Low-fat salad dressing  Other ways to decrease fat:   · Read food labels before you buy foods  Choose foods that have less than 30% of calories from fat  Choose low-fat or fat-free dairy products  Remember that fat free does not mean calorie free  These foods still contain calories, and too many calories can lead to weight gain  · Trim fat from meat and avoid fried food  Trim all visible fat from meat before you cook it  Remove the skin from poultry  Do not gibbs meat, fish, or poultry  Bake, roast, boil, or broil these foods instead  Avoid fried foods  Eat a baked potato instead of [place] fries  Steam vegetables instead of sautéing them in butter  · Add less fat to foods  Use imitation delcid bits on salads and baked potatoes instead of regular delcid bits  Use fat-free or low-fat salad dressings instead of regular dressings  Use low-fat or nonfat butter-flavored topping instead of regular butter or margarine on popcorn and other foods  Ways to decrease fat in recipes:  Replace high-fat ingredients with low-fat or nonfat ones  This may cause baked goods to be drier than usual  You may need to use nonfat cooking spray on pans to prevent food from sticking  You also may need to change the amount of other ingredients, such as water, in the recipe  Try the following:  · Use low-fat or light margarine instead of regular margarine or shortening  · Use lean ground turkey breast or chicken, or lean ground beef (less than 5% fat) instead of hamburger  · Add 1 teaspoon of canola oil to 8 ounces of skim milk instead of using cream or half and half  · Use grated zucchini, carrots, or apples in breads instead of coconut  · Use blenderized, low-fat cottage cheese, plain tofu, or low-fat ricotta cheese instead of cream cheese  · Use 1 egg white and 1 teaspoon of canola oil, or use ¼ cup (2 ounces) of fat-free egg substitute instead of a whole egg  · Replace half of the oil that is called for in a recipe with applesauce when you bake  Use 3 tablespoons of cocoa powder and 1 tablespoon of canola oil instead of a square of baking chocolate  How to increase fiber:  Eat enough high-fiber foods to get 20 to 30 grams of fiber every day  Slowly increase your fiber intake to avoid stomach cramps, gas, and other problems  · Eat 3 ounces of whole-grain foods each day  An ounce is about 1 slice of bread  Eat whole-grain breads, such as whole-wheat bread   Whole wheat, whole-wheat flour, or other whole grains should be listed as the first ingredient on the food label  Replace white flour with whole-grain flour or use half of each in recipes  Whole-grain flour is heavier than white flour, so you may have to add more yeast or baking powder  · Eat a high-fiber cereal for breakfast   Oatmeal is a good source of soluble fiber  Look for cereals that have bran or fiber in the name  Choose whole-grain products, such as brown rice, barley, and whole-wheat pasta  · Eat more beans, peas, and lentils  For example, add beans to soups or salads  Eat at least 5 cups of fruits and vegetables each day  Eat fruits and vegetables with the peel because the peel is high in fiber  © 2017 [facility] Information is for End User's use only and may not be sold, redistributed or otherwise used for commercial purposes  All illustrations and images included in CareNotes® are the copyrighted property of [employer] M [employer]  or [employer]  The above information is an  only  It is not intended as medical advice for individual conditions or treatments  Talk to your doctor, nurse or pharmacist before following any medical regimen to see if it is safe and effective for you  Heart Healthy Diet   AMBULATORY CARE:   A heart healthy diet  is an eating plan low in total fat, unhealthy fats, and sodium (salt)  A heart healthy diet helps decrease your risk for heart disease and stroke  Limit the amount of fat you eat to 25% to 35% of your total daily calories  Limit sodium to less than 2,300 mg each day  Healthy fats:  Healthy fats can help improve cholesterol levels  The risk for heart disease is decreased when cholesterol levels are normal  Choose healthy fats, such as the following:  · Unsaturated fat  is found in foods such as soybean, canola, olive, corn, and safflower oils  It is also found in soft tub margarine that is made with liquid vegetable oil       · Omega-3 fat  is found in certain fish, such as salmon, tuna, and trout, and in walnuts and flaxseed  Unhealthy fats:  Unhealthy fats can cause unhealthy cholesterol levels in your blood and increase your risk of heart disease  Limit unhealthy fats, such as the following:  · Cholesterol  is found in animal foods, such as eggs and lobster, and in dairy products made from whole milk  Limit cholesterol to less than 300 milligrams (mg) each day  You may need to limit cholesterol to 200 mg each day if you have heart disease  · Saturated fat  is found in meats, such as delcid and hamburger  It is also found in chicken or turkey skin, whole milk, and butter  Limit saturated fat to less than 7% of your total daily calories  Limit saturated fat to less than 6% if you have heart disease or are at increased risk for it  · Trans fat  is found in packaged foods, such as potato chips and cookies  It is also in hard margarine, some fried foods, and shortening  Avoid trans fats as much as possible    Heart healthy foods and drinks to include:  Ask your dietitian or healthcare provider how many servings to have from each of the following food groups:  · Grains:      ¨ Whole-wheat breads, cereals, and pastas, and brown rice    ¨ Low-fat, low-sodium crackers and chips    · Vegetables:      ¨ Broccoli, green beans, green peas, and spinach    ¨ Collards, kale, and lima beans    ¨ Carrots, sweet potatoes, tomatoes, and peppers    ¨ Canned vegetables with no salt added    · Fruits:      ¨ Bananas, peaches, pears, and pineapple    ¨ Grapes, raisins, and dates    ¨ Oranges, tangerines, grapefruit, orange juice, and grapefruit juice    ¨ Apricots, mangoes, melons, and papaya    ¨ Raspberries and strawberries    ¨ Canned fruit with no added sugar    · Low-fat dairy products:      ¨ Nonfat (skim) milk, 1% milk, and low-fat almond, cashew, or soy milks fortified with calcium    ¨ Low-fat cheese, regular or frozen yogurt, and cottage cheese    · Meats and proteins , such as lean cuts of beef and pork (loin, leg, round), skinless chicken and turkey, legumes, soy products, egg whites, and nuts  Foods and drinks to limit or avoid:  Ask your dietitian or healthcare provider about these and other foods that are high in unhealthy fat, sodium, and sugar:  · Snack or packaged foods , such as frozen dinners, cookies, macaroni and cheese, and cereals with more than 300 mg of sodium per serving    · Canned or dry mixes  for cakes, soups, sauces, or gravies    · Vegetables with added sodium , such as instant potatoes, vegetables with added sauces, or regular canned vegetables    · Other foods high in sodium , such as ketchup, barbecue sauce, salad dressing, pickles, olives, soy sauce, and miso    · High-fat dairy foods  such as whole or 2% milk, cream cheese, or sour cream, and cheeses     · High-fat protein foods  such as high-fat cuts of beef (T-bone steaks, ribs), chicken or turkey with skin, and organ meats, such as liver    · Cured or smoked meats , such as hot dogs, delcid, and sausage    · Unhealthy fats and oils , such as butter, stick margarine, shortening, and cooking oils such as coconut or palm oil    · Food and drinks high in sugar , such as soft drinks (soda), sports drinks, sweetened tea, candy, cake, cookies, pies, and doughnuts  Other diet guidelines to follow:   · Eat more foods containing omega-3 fats  Eat fish high in omega-3 fats at least 2 times a week  · Limit alcohol  Too much alcohol can damage your heart and raise your blood pressure  Women should limit alcohol to 1 drink a day  Men should limit alcohol to 2 drinks a day  A drink of alcohol is 12 ounces of beer, 5 ounces of wine, or 1½ ounces of liquor  · Choose low-sodium foods  High-sodium foods can lead to high blood pressure  Add little or no salt to food you prepare  Use herbs and spices in place of salt  · Eat more fiber  to help lower cholesterol levels  Eat at least 5 servings of fruits and vegetables each day   Eat 3 ounces of whole-grain foods each day  Legumes (beans) are also a good source of fiber  Lifestyle guidelines:   · Do not smoke  Nicotine and other chemicals in cigarettes and cigars can cause lung and heart damage  Ask your healthcare provider for information if you currently smoke and need help to quit  E-cigarettes or smokeless tobacco still contain nicotine  Talk to your healthcare provider before you use these products  · Exercise regularly  to help you maintain a healthy weight and improve your blood pressure and cholesterol levels  Ask your healthcare provider about the best exercise plan for you  Do not start an exercise program without asking your healthcare provider  Follow up with your healthcare provider as directed:  Write down your questions so you remember to ask them during your visits  © 2017 [address] Information is for End User's use only and may not be sold, redistributed or otherwise used for commercial purposes  All illustrations and images included in CareNotes® are the copyrighted property of [employer] M [employer]  or [employer]  The above information is an  only  It is not intended as medical advice for individual conditions or treatments  Talk to your doctor, nurse or pharmacist before following any medical regimen to see if it is safe and effective for you  ANNALEE Chun  BMI Counseling: Body mass index is 33 2 kg/m²  Discussed the patient's BMI with her  The BMI is above average  BMI counseling and education was provided to the patient  Nutrition recommendations include reducing portion sizes, decreasing overall calorie intake, 3-5 servings of fruits/vegetables daily, reducing fast food intake, consuming healthier snacks, decreasing soda and/or juice intake, moderation in carbohydrate intake, increasing intake of lean protein, reducing intake of saturated fat and trans fat and reducing intake of cholesterol

## 2019-08-26 ENCOUNTER — TELEPHONE (OUTPATIENT)
Dept: FAMILY MEDICINE CLINIC | Facility: CLINIC | Age: 49
End: 2019-08-26

## 2019-08-26 NOTE — TELEPHONE ENCOUNTER
Her hands and feet are swollen by the end of the day  when she goes to bed the swelling goes down, but the hands are swollen in the morning  She states you gave her something in the past for it

## 2019-08-29 NOTE — TELEPHONE ENCOUNTER
Please call in verbal  order to her pharmacy  HCTZ 12 5MG   May take 1 pill Once daily prn edema     Dispense 30 w 3 rf  Call her and let her know

## 2019-11-06 ENCOUNTER — OFFICE VISIT (OUTPATIENT)
Dept: FAMILY MEDICINE CLINIC | Facility: CLINIC | Age: 49
End: 2019-11-06
Payer: COMMERCIAL

## 2019-11-06 VITALS
OXYGEN SATURATION: 95 % | HEART RATE: 78 BPM | SYSTOLIC BLOOD PRESSURE: 130 MMHG | WEIGHT: 183 LBS | TEMPERATURE: 98.9 F | HEIGHT: 60 IN | BODY MASS INDEX: 35.93 KG/M2 | DIASTOLIC BLOOD PRESSURE: 86 MMHG

## 2019-11-06 DIAGNOSIS — G47.00 INSOMNIA, UNSPECIFIED TYPE: ICD-10-CM

## 2019-11-06 DIAGNOSIS — F41.9 ANXIETY: ICD-10-CM

## 2019-11-06 DIAGNOSIS — F06.4 ANXIETY DISORDER DUE TO MEDICAL CONDITION: Primary | ICD-10-CM

## 2019-11-06 DIAGNOSIS — M25.552 PAIN OF LEFT HIP JOINT: ICD-10-CM

## 2019-11-06 PROCEDURE — 3008F BODY MASS INDEX DOCD: CPT | Performed by: FAMILY MEDICINE

## 2019-11-06 PROCEDURE — 99214 OFFICE O/P EST MOD 30 MIN: CPT | Performed by: FAMILY MEDICINE

## 2019-11-06 RX ORDER — ESZOPICLONE 3 MG/1
3 TABLET, FILM COATED ORAL
Qty: 30 TABLET | Refills: 3 | Status: SHIPPED | OUTPATIENT
Start: 2019-11-06 | End: 2020-01-16 | Stop reason: ALTCHOICE

## 2019-11-06 RX ORDER — LORAZEPAM 1 MG/1
TABLET ORAL
Qty: 60 TABLET | Refills: 1 | Status: SHIPPED | OUTPATIENT
Start: 2019-11-06 | End: 2020-02-07 | Stop reason: SDUPTHER

## 2019-11-06 NOTE — PROGRESS NOTES
Standard Visit   Assessment/Plan:     Visit Diagnosis:  Diagnoses and all orders for this visit:    Anxiety disorder due to medical condition    Anxiety  -     LORazepam (ATIVAN) 1 mg tablet; May take 1 pill twice a day as needed for anxiety    Insomnia, unspecified type  -     eszopiclone (LUNESTA) 3 MG tablet; Take 1 tablet (3 mg total) by mouth daily at bedtime as needed for sleep Take immediately before bedtime    Pain of left hip joint  -     XR hip/pelv 2-3 vws left if performed; Future    Please compose a letter for her airline  [employer]  Needs like canceled on November 21st  Carmina to give the details of flight  Meds are refilled  Per patient request  Please compose letter  To whom it May concern  Warmer  Weather will help with arthritic condition of her bones    Subjective:    Patient ID: April Jr is a 54 y o  female       Patient is here with the following concerns:    Charlie Johnson is  She is getting ready to move to [place]  Worked at [employer] for 22 years  With Dr Paolo Duvall   Hypothyroid on 80 mics  Two years ago ran into a shredder in the office and fractured her left hip  Hip has been bothering her for the past few weeks  Having trouble sitting for extended periods of time recently  Not sure if she re-injured it packing  Has a plane flight coming up and not sure if she is going to be able to sit for that length of time would really like to have an x-ray to make sure that she did not re-injure that hip  Has a plain reservation but is very concerned about the flight   Does not suffer from hyperlipidemia or hypertension  Has some anxiety from the job and the computer and going [employer]  In suffers from insomnia as well  Use is Zyprexa Zydis and has done so for many years it works very well        The following portions of the patient's history were reviewed and updated as appropriate: allergies, current medications, past family history, past medical history, past social history, past surgical history and problem list     Review of Systems   Constitutional: Negative for activity change and fever  HENT: Negative for nosebleeds and trouble swallowing  Eyes: Negative  Respiratory: Negative  Cardiovascular: Negative for palpitations  Gastrointestinal: Negative for abdominal pain, blood in stool and vomiting  Endocrine: Negative for cold intolerance  Genitourinary: Negative  Musculoskeletal: Negative for neck stiffness  Skin: Negative for pallor  Allergic/Immunologic: Negative for immunocompromised state  Neurological: Negative for seizures and facial asymmetry  Hematological: Negative for adenopathy  Psychiatric/Behavioral: Negative for agitation and suicidal ideas           /86   Pulse 78   Temp 98 9 °F (37 2 °C) (Tympanic)   Ht 5' (1 524 m)   Wt 83 kg (183 lb)   SpO2 95%   BMI 35 74 kg/m²   Social History     Socioeconomic History    Marital status: /Civil Union     Spouse name: Not on file    Number of children: Not on file    Years of education: Not on file    Highest education level: Not on file   Occupational History    Not on file   Social Needs    Financial resource strain: Not on file    Food insecurity:     Worry: Not on file     Inability: Not on file    Transportation needs:     Medical: Not on file     Non-medical: Not on file   Tobacco Use    Smoking status: Never Smoker    Smokeless tobacco: Never Used    Tobacco comment: former smoker noted in "[facility]"   Substance and Sexual Activity    Alcohol use: Yes     Comment: Rarely     Drug use: Not on file    Sexual activity: Not on file   Lifestyle    Physical activity:     Days per week: Not on file     Minutes per session: Not on file    Stress: Not on file   Relationships    Social connections:     Talks on phone: Not on file     Gets together: Not on file     Attends Latter-day service: Not on file     Active member of club or organization: Not on file     Attends meetings of clubs or organizations: Not on file     Relationship status: Not on file    Intimate partner violence:     Fear of current or ex partner: Not on file     Emotionally abused: Not on file     Physically abused: Not on file     Forced sexual activity: Not on file   Other Topics Concern    Not on file   Social History Narrative    Not on file     Past Medical History:   Diagnosis Date    Depression with anxiety     last assessed: April 11, 2017    Hypothyroidism     last assessed: Aug 29, 2017    Persistent insomnia     last assessed: Dec 21, 2016     Family History   Adopted: Yes     Past Surgical History:   Procedure Laterality Date    MYRINGOTOMY W/ TUBES Left     SINUS SURGERY         Current Outpatient Medications:     ergocalciferol (VITAMIN D2) 50,000 units, TAKE 1 PILL TWICE A WEEK X1 MONTH, Disp: 8 capsule, Rfl: 0    eszopiclone (LUNESTA) 3 MG tablet, Take 1 tablet (3 mg total) by mouth daily at bedtime as needed for sleep Take immediately before bedtime, Disp: 30 tablet, Rfl: 3    LORazepam (ATIVAN) 1 mg tablet, May take 1 pill twice a day as needed for anxiety, Disp: 60 tablet, Rfl: 1    OLANZapine (ZyPREXA ZYDIS) 5 mg dispersible tablet, Take 1 pill daily at bedtime, Disp: 90 tablet, Rfl: 1    pantoprazole (PROTONIX) 40 mg tablet, Take 1 tablet (40 mg total) by mouth daily before breakfast, Disp: 90 tablet, Rfl: 1    thyroid (ARMOUR THYROID) 120 MG tablet, Take by mouth, Disp: , Rfl:     Lab Review   No visits with results within 2 Month(s) from this visit     Latest known visit with results is:   [facility] Office Visit on 08/29/2017   Component Date Value    Color, UA 08/29/2017 Clear     Clarity, UA 08/29/2017 Transparent     Leukocytes, UA 08/29/2017 negative     Nitrite, UA 08/29/2017 negative     Blood, UA 08/29/2017 5-10     Bilirubin, UA 08/29/2017 negative     Urobilinogen, UA 08/29/2017 negative     Protein, UA 08/29/2017 6 0     pH, UA 08/29/2017 5 0     Specific Spiro, UA 08/29/2017 1 010     Ketones, UA 08/29/2017 negative     Glucose 08/29/2017 negative      No visits with results within 6 Month(s) from this visit  Latest known visit with results is:   [facility] Visit on 08/29/2017   Component Date Value    Color, UA 08/29/2017 Clear     Clarity, UA 08/29/2017 Transparent     Leukocytes, UA 08/29/2017 negative     Nitrite, UA 08/29/2017 negative     Blood, UA 08/29/2017 5-10     Bilirubin, UA 08/29/2017 negative     Urobilinogen, UA 08/29/2017 negative     Protein, UA 08/29/2017 6 0     pH, UA 08/29/2017 5 0     Specific Gravity, UA 08/29/2017 1 010     Ketones, UA 08/29/2017 negative     Glucose 08/29/2017 negative      No results found for: NA, K, CL, CO2, BUN, CREATININE, GLUCOSE, CALCIUM  No results found for: NA, K, CO2, BUN, CREATININE, GLUCOSE, CALCIUM  No results found for: CKTOTAL, CKMB, CKMBINDEX, TROPONINI  No results found for: CKTOTAL, CKMB, CKMBINDEX, TROPONINI  No results found for: WBC, HGB, HCT, MCV, PLT  No results found for: WBC, HGB, HCT, MCV, PLT  No results found for: CHOL, TRIG, HDL, LDLDIRECT  No results found for: CHOL, TRIG, HDL, LDLDIRECT       Objective:     Physical Exam   Constitutional: She is oriented to person, place, and time  She appears well-developed and well-nourished  HENT:   Head: Normocephalic and atraumatic  Eyes: Pupils are equal, round, and reactive to light  Neck: Normal range of motion  Neck supple  Cardiovascular: Normal rate  Pulmonary/Chest: Effort normal and breath sounds normal    Abdominal: Soft  Musculoskeletal: Normal range of motion  Neurological: She is alert and oriented to person, place, and time  Skin: Skin is warm and dry  Psychiatric: She has a normal mood and affect  Nursing note and vitals reviewed        Social History     Socioeconomic History    Marital status: /Civil Union     Spouse name: Not on file    Number of children: Not on file    Years of education: Not on file    Highest education level: Not on file   Occupational History    Not on file   Social Needs    Financial resource strain: Not on file    Food insecurity:     Worry: Not on file     Inability: Not on file    Transportation needs:     Medical: Not on file     Non-medical: Not on file   Tobacco Use    Smoking status: Never Smoker    Smokeless tobacco: Never Used    Tobacco comment: former smoker noted in "[facility]scripts"   Substance and Sexual Activity    Alcohol use: Yes     Comment: Rarely     Drug use: Not on file    Sexual activity: Not on file   Lifestyle    Physical activity:     Days per week: Not on file     Minutes per session: Not on file    Stress: Not on file   Relationships    Social connections:     Talks on phone: Not on file     Gets together: Not on file     Attends Yarsani service: Not on file     Active member of club or organization: Not on file     Attends meetings of clubs or organizations: Not on file     Relationship status: Not on file    Intimate partner violence:     Fear of current or ex partner: Not on file     Emotionally abused: Not on file     Physically abused: Not on file     Forced sexual activity: Not on file   Other Topics Concern    Not on file   Social History Narrative    Not on file     Past Medical History:   Diagnosis Date    Depression with anxiety     last assessed: April 11, 2017    Hypothyroidism     last assessed: Aug 29, 2017    Persistent insomnia     last assessed: Dec 21, 2016       Current Outpatient Medications:     ergocalciferol (VITAMIN D2) 50,000 units, TAKE 1 PILL TWICE A WEEK X1 MONTH, Disp: 8 capsule, Rfl: 0    eszopiclone (LUNESTA) 3 MG tablet, Take 1 tablet (3 mg total) by mouth daily at bedtime as needed for sleep Take immediately before bedtime, Disp: 30 tablet, Rfl: 3    LORazepam (ATIVAN) 1 mg tablet, May take 1 pill twice a day as needed for anxiety, Disp: 60 tablet, Rfl: 1    OLANZapine (ZyPREXA ZYDIS) 5 mg dispersible tablet, Take 1 pill daily at bedtime, Disp: 90 tablet, Rfl: 1    pantoprazole (PROTONIX) 40 mg tablet, Take 1 tablet (40 mg total) by mouth daily before breakfast, Disp: 90 tablet, Rfl: 1    thyroid (ARMOUR THYROID) 120 MG tablet, Take by mouth, Disp: , Rfl:   Family History   Adopted: Yes       There are no Patient Instructions on file for this visit  ANNALEE Jung  BMI Counseling: Body mass index is 35 74 kg/m²  The BMI is above normal  Nutrition recommendations include reducing portion sizes

## 2020-01-07 DIAGNOSIS — G47.00 INSOMNIA, UNSPECIFIED TYPE: ICD-10-CM

## 2020-01-16 DIAGNOSIS — F51.01 PRIMARY INSOMNIA: Primary | ICD-10-CM

## 2020-01-16 RX ORDER — TEMAZEPAM 30 MG/1
30 CAPSULE ORAL
Qty: 30 CAPSULE | Refills: 3 | Status: SHIPPED | OUTPATIENT
Start: 2020-01-16 | End: 2020-01-17 | Stop reason: SDUPTHER

## 2020-01-17 DIAGNOSIS — F51.01 PRIMARY INSOMNIA: ICD-10-CM

## 2020-01-17 RX ORDER — TEMAZEPAM 30 MG/1
30 CAPSULE ORAL
Qty: 30 CAPSULE | Refills: 3 | Status: SHIPPED | OUTPATIENT
Start: 2020-01-17 | End: 2020-06-04 | Stop reason: SDUPTHER

## 2020-01-17 NOTE — TELEPHONE ENCOUNTER
Let Walter Wolf know I've called in temazepam for her for sleep  Generic, use [facility] as well  Should be inexpensive

## 2020-02-07 DIAGNOSIS — F41.9 ANXIETY: ICD-10-CM

## 2020-02-07 DIAGNOSIS — E03.9 ACQUIRED HYPOTHYROIDISM: Primary | ICD-10-CM

## 2020-02-07 RX ORDER — LORAZEPAM 1 MG/1
TABLET ORAL
Qty: 60 TABLET | Refills: 1 | Status: SHIPPED | OUTPATIENT
Start: 2020-02-07 | End: 2020-03-30 | Stop reason: SDUPTHER

## 2020-02-07 RX ORDER — LEVOTHYROXINE AND LIOTHYRONINE 76; 18 UG/1; UG/1
120 TABLET ORAL DAILY
Qty: 30 TABLET | Refills: 1 | Status: SHIPPED | OUTPATIENT
Start: 2020-02-07 | End: 2020-04-06

## 2020-03-30 DIAGNOSIS — F41.9 ANXIETY: ICD-10-CM

## 2020-03-30 RX ORDER — LORAZEPAM 1 MG/1
TABLET ORAL
Qty: 60 TABLET | Refills: 1 | Status: SHIPPED | OUTPATIENT
Start: 2020-03-30 | End: 2020-04-07 | Stop reason: SDUPTHER

## 2020-03-30 NOTE — TELEPHONE ENCOUNTER
Patient called the [facility] to request medication refill  Checked the [facility] and was last filled on 11/06/2019, qty 61, day30   Thank you

## 2020-04-04 DIAGNOSIS — E03.9 ACQUIRED HYPOTHYROIDISM: ICD-10-CM

## 2020-04-06 ENCOUNTER — TELEPHONE (OUTPATIENT)
Dept: FAMILY MEDICINE CLINIC | Facility: CLINIC | Age: 50
End: 2020-04-06

## 2020-04-06 RX ORDER — THYROID, PORCINE 120 MG/1
TABLET ORAL
Qty: 30 TABLET | Refills: 1 | Status: SHIPPED | OUTPATIENT
Start: 2020-04-06

## 2020-04-07 DIAGNOSIS — F41.9 ANXIETY: ICD-10-CM

## 2020-04-07 RX ORDER — LORAZEPAM 1 MG/1
TABLET ORAL
Qty: 60 TABLET | Refills: 1 | Status: SHIPPED | OUTPATIENT
Start: 2020-04-07

## 2020-06-04 DIAGNOSIS — F51.01 PRIMARY INSOMNIA: ICD-10-CM

## 2020-06-04 RX ORDER — TEMAZEPAM 30 MG/1
30 CAPSULE ORAL
Qty: 30 CAPSULE | Refills: 3 | Status: SHIPPED | OUTPATIENT
Start: 2020-06-04

## 2020-06-07 DIAGNOSIS — F41.9 ANXIETY: ICD-10-CM

## 2020-06-09 ENCOUNTER — TELEPHONE (OUTPATIENT)
Dept: FAMILY MEDICINE CLINIC | Facility: CLINIC | Age: 50
End: 2020-06-09

## 2020-06-11 DIAGNOSIS — F41.9 ANXIETY: ICD-10-CM

## 2020-06-11 DIAGNOSIS — Z12.31 SCREENING MAMMOGRAM, ENCOUNTER FOR: Primary | ICD-10-CM

## 2020-06-11 RX ORDER — LORAZEPAM 1 MG/1
TABLET ORAL
Qty: 60 TABLET | Refills: 1 | OUTPATIENT
Start: 2020-06-11

## 2020-06-11 NOTE — PATIENT INSTRUCTIONS

## 2020-06-17 ENCOUNTER — TELEPHONE (OUTPATIENT)
Dept: FAMILY MEDICINE CLINIC | Facility: CLINIC | Age: 50
End: 2020-06-17

## 2020-06-17 RX ORDER — LORAZEPAM 1 MG/1
TABLET ORAL
Qty: 60 TABLET | Refills: 1 | OUTPATIENT
Start: 2020-06-17